# Patient Record
Sex: MALE | Race: WHITE | Employment: FULL TIME | URBAN - METROPOLITAN AREA
[De-identification: names, ages, dates, MRNs, and addresses within clinical notes are randomized per-mention and may not be internally consistent; named-entity substitution may affect disease eponyms.]

---

## 2019-02-14 ENCOUNTER — APPOINTMENT (OUTPATIENT)
Dept: CT IMAGING | Age: 54
End: 2019-02-14
Payer: COMMERCIAL

## 2019-02-14 ENCOUNTER — HOSPITAL ENCOUNTER (EMERGENCY)
Age: 54
Discharge: LEFT BEFORE DISCHARGE | End: 2019-02-15
Attending: EMERGENCY MEDICINE
Payer: COMMERCIAL

## 2019-02-14 ENCOUNTER — APPOINTMENT (OUTPATIENT)
Dept: GENERAL RADIOLOGY | Age: 54
End: 2019-02-14
Payer: COMMERCIAL

## 2019-02-14 DIAGNOSIS — R91.8 PULMONARY NODULES: Primary | ICD-10-CM

## 2019-02-14 DIAGNOSIS — R07.9 CHEST PAIN, UNSPECIFIED TYPE: ICD-10-CM

## 2019-02-14 LAB
ALBUMIN SERPL-MCNC: 3.6 G/DL (ref 3.5–5.2)
ALP BLD-CCNC: 89 U/L (ref 40–129)
ALT SERPL-CCNC: 38 U/L (ref 0–40)
ANION GAP SERPL CALCULATED.3IONS-SCNC: 6 MMOL/L (ref 7–16)
AST SERPL-CCNC: 43 U/L (ref 0–39)
BILIRUB SERPL-MCNC: 0.5 MG/DL (ref 0–1.2)
BUN BLDV-MCNC: 14 MG/DL (ref 6–20)
CALCIUM SERPL-MCNC: 8.6 MG/DL (ref 8.6–10.2)
CHLORIDE BLD-SCNC: 106 MMOL/L (ref 98–107)
CO2: 24 MMOL/L (ref 22–29)
CREAT SERPL-MCNC: 0.9 MG/DL (ref 0.7–1.2)
GFR AFRICAN AMERICAN: >60
GFR NON-AFRICAN AMERICAN: >60 ML/MIN/1.73
GLUCOSE BLD-MCNC: 98 MG/DL (ref 74–99)
HCT VFR BLD CALC: 42.3 % (ref 37–54)
HEMOGLOBIN: 13.6 G/DL (ref 12.5–16.5)
INR BLD: 1.2
MCH RBC QN AUTO: 28.2 PG (ref 26–35)
MCHC RBC AUTO-ENTMCNC: 32.2 % (ref 32–34.5)
MCV RBC AUTO: 87.6 FL (ref 80–99.9)
PDW BLD-RTO: 13.7 FL (ref 11.5–15)
PLATELET # BLD: 344 E9/L (ref 130–450)
PMV BLD AUTO: 10.2 FL (ref 7–12)
POTASSIUM SERPL-SCNC: 4.7 MMOL/L (ref 3.5–5)
PROTHROMBIN TIME: 13.8 SEC (ref 9.3–12.4)
RBC # BLD: 4.83 E12/L (ref 3.8–5.8)
SODIUM BLD-SCNC: 136 MMOL/L (ref 132–146)
TOTAL PROTEIN: 6.2 G/DL (ref 6.4–8.3)
TROPONIN: <0.01 NG/ML (ref 0–0.03)
WBC # BLD: 7.7 E9/L (ref 4.5–11.5)

## 2019-02-14 PROCEDURE — 6360000002 HC RX W HCPCS: Performed by: EMERGENCY MEDICINE

## 2019-02-14 PROCEDURE — 84484 ASSAY OF TROPONIN QUANT: CPT

## 2019-02-14 PROCEDURE — 71045 X-RAY EXAM CHEST 1 VIEW: CPT

## 2019-02-14 PROCEDURE — 93005 ELECTROCARDIOGRAM TRACING: CPT | Performed by: EMERGENCY MEDICINE

## 2019-02-14 PROCEDURE — 85027 COMPLETE CBC AUTOMATED: CPT

## 2019-02-14 PROCEDURE — 85610 PROTHROMBIN TIME: CPT

## 2019-02-14 PROCEDURE — 71275 CT ANGIOGRAPHY CHEST: CPT

## 2019-02-14 PROCEDURE — 96374 THER/PROPH/DIAG INJ IV PUSH: CPT

## 2019-02-14 PROCEDURE — 99285 EMERGENCY DEPT VISIT HI MDM: CPT

## 2019-02-14 PROCEDURE — 80053 COMPREHEN METABOLIC PANEL: CPT

## 2019-02-14 PROCEDURE — 6360000004 HC RX CONTRAST MEDICATION: Performed by: RADIOLOGY

## 2019-02-14 RX ORDER — KETOROLAC TROMETHAMINE 30 MG/ML
30 INJECTION, SOLUTION INTRAMUSCULAR; INTRAVENOUS ONCE
Status: COMPLETED | OUTPATIENT
Start: 2019-02-14 | End: 2019-02-14

## 2019-02-14 RX ADMIN — IOPAMIDOL 70 ML: 755 INJECTION, SOLUTION INTRAVENOUS at 23:00

## 2019-02-14 RX ADMIN — KETOROLAC TROMETHAMINE 30 MG: 30 INJECTION, SOLUTION INTRAMUSCULAR at 22:09

## 2019-02-14 ASSESSMENT — PAIN SCALES - GENERAL: PAINLEVEL_OUTOF10: 8

## 2019-02-15 VITALS
SYSTOLIC BLOOD PRESSURE: 158 MMHG | OXYGEN SATURATION: 96 % | RESPIRATION RATE: 17 BRPM | WEIGHT: 205 LBS | BODY MASS INDEX: 25.49 KG/M2 | DIASTOLIC BLOOD PRESSURE: 105 MMHG | HEART RATE: 90 BPM | HEIGHT: 75 IN | TEMPERATURE: 98.1 F

## 2019-02-15 LAB
EKG ATRIAL RATE: 100 BPM
EKG P AXIS: 81 DEGREES
EKG P-R INTERVAL: 144 MS
EKG Q-T INTERVAL: 380 MS
EKG QRS DURATION: 90 MS
EKG QTC CALCULATION (BAZETT): 490 MS
EKG R AXIS: 84 DEGREES
EKG T AXIS: 69 DEGREES
EKG VENTRICULAR RATE: 100 BPM
TROPONIN: <0.01 NG/ML (ref 0–0.03)

## 2019-02-15 PROCEDURE — 6360000002 HC RX W HCPCS: Performed by: EMERGENCY MEDICINE

## 2019-02-15 PROCEDURE — 36415 COLL VENOUS BLD VENIPUNCTURE: CPT

## 2019-02-15 PROCEDURE — 96372 THER/PROPH/DIAG INJ SC/IM: CPT

## 2019-02-15 PROCEDURE — 84484 ASSAY OF TROPONIN QUANT: CPT

## 2019-02-15 RX ORDER — ORPHENADRINE CITRATE 30 MG/ML
60 INJECTION INTRAMUSCULAR; INTRAVENOUS ONCE
Status: COMPLETED | OUTPATIENT
Start: 2019-02-15 | End: 2019-02-15

## 2019-02-15 RX ADMIN — ORPHENADRINE CITRATE 60 MG: 30 INJECTION INTRAMUSCULAR; INTRAVENOUS at 00:29

## 2019-02-15 ASSESSMENT — PAIN SCALES - GENERAL: PAINLEVEL_OUTOF10: 8

## 2019-02-25 ENCOUNTER — HOSPITAL ENCOUNTER (INPATIENT)
Age: 54
LOS: 2 days | Discharge: HOME | DRG: 291 | End: 2019-02-27
Attending: EMERGENCY MEDICINE | Admitting: INTERNAL MEDICINE
Payer: COMMERCIAL

## 2019-02-25 ENCOUNTER — APPOINTMENT (OUTPATIENT)
Dept: GENERAL RADIOLOGY | Age: 54
DRG: 291 | End: 2019-02-25
Payer: COMMERCIAL

## 2019-02-25 DIAGNOSIS — I50.9 CONGESTIVE HEART FAILURE OF UNKNOWN ETIOLOGY (HCC): Primary | ICD-10-CM

## 2019-02-25 DIAGNOSIS — J44.9 CHRONIC OBSTRUCTIVE PULMONARY DISEASE, UNSPECIFIED COPD TYPE (HCC): ICD-10-CM

## 2019-02-25 PROBLEM — R94.31 ABNORMAL QT INTERVAL PRESENT ON ELECTROCARDIOGRAM: Status: ACTIVE | Noted: 2019-02-25

## 2019-02-25 PROBLEM — R00.0 SINUS TACHYCARDIA BY ELECTROCARDIOGRAM: Status: ACTIVE | Noted: 2019-02-25

## 2019-02-25 LAB
ALBUMIN SERPL-MCNC: 3.9 G/DL (ref 3.5–5.2)
ALP BLD-CCNC: 99 U/L (ref 40–129)
ALT SERPL-CCNC: 22 U/L (ref 0–40)
ANION GAP SERPL CALCULATED.3IONS-SCNC: 8 MMOL/L (ref 7–16)
AST SERPL-CCNC: 18 U/L (ref 0–39)
BASOPHILS ABSOLUTE: 0.05 E9/L (ref 0–0.2)
BASOPHILS RELATIVE PERCENT: 0.7 % (ref 0–2)
BILIRUB SERPL-MCNC: 0.7 MG/DL (ref 0–1.2)
BUN BLDV-MCNC: 17 MG/DL (ref 6–20)
CALCIUM SERPL-MCNC: 9.1 MG/DL (ref 8.6–10.2)
CHLORIDE BLD-SCNC: 102 MMOL/L (ref 98–107)
CO2: 30 MMOL/L (ref 22–29)
CREAT SERPL-MCNC: 1.1 MG/DL (ref 0.7–1.2)
D DIMER: 311 NG/ML DDU
EOSINOPHILS ABSOLUTE: 0.13 E9/L (ref 0.05–0.5)
EOSINOPHILS RELATIVE PERCENT: 1.9 % (ref 0–6)
GFR AFRICAN AMERICAN: >60
GFR NON-AFRICAN AMERICAN: >60 ML/MIN/1.73
GLUCOSE BLD-MCNC: 97 MG/DL (ref 74–99)
HCT VFR BLD CALC: 44 % (ref 37–54)
HEMOGLOBIN: 13.9 G/DL (ref 12.5–16.5)
IMMATURE GRANULOCYTES #: 0.01 E9/L
IMMATURE GRANULOCYTES %: 0.1 % (ref 0–5)
LACTIC ACID: 1.1 MMOL/L (ref 0.5–2.2)
LYMPHOCYTES ABSOLUTE: 1.64 E9/L (ref 1.5–4)
LYMPHOCYTES RELATIVE PERCENT: 23.7 % (ref 20–42)
MCH RBC QN AUTO: 28.1 PG (ref 26–35)
MCHC RBC AUTO-ENTMCNC: 31.6 % (ref 32–34.5)
MCV RBC AUTO: 89.1 FL (ref 80–99.9)
MONOCYTES ABSOLUTE: 0.43 E9/L (ref 0.1–0.95)
MONOCYTES RELATIVE PERCENT: 6.2 % (ref 2–12)
NEUTROPHILS ABSOLUTE: 4.65 E9/L (ref 1.8–7.3)
NEUTROPHILS RELATIVE PERCENT: 67.4 % (ref 43–80)
PDW BLD-RTO: 14.6 FL (ref 11.5–15)
PLATELET # BLD: 238 E9/L (ref 130–450)
PMV BLD AUTO: 10.4 FL (ref 7–12)
POTASSIUM SERPL-SCNC: 4 MMOL/L (ref 3.5–5)
PRO-BNP: 6104 PG/ML (ref 0–125)
RBC # BLD: 4.94 E12/L (ref 3.8–5.8)
SODIUM BLD-SCNC: 140 MMOL/L (ref 132–146)
TOTAL PROTEIN: 6.4 G/DL (ref 6.4–8.3)
TROPONIN: <0.01 NG/ML (ref 0–0.03)
TROPONIN: <0.01 NG/ML (ref 0–0.03)
WBC # BLD: 6.9 E9/L (ref 4.5–11.5)

## 2019-02-25 PROCEDURE — 6360000002 HC RX W HCPCS: Performed by: CLINICAL NURSE SPECIALIST

## 2019-02-25 PROCEDURE — 85378 FIBRIN DEGRADE SEMIQUANT: CPT

## 2019-02-25 PROCEDURE — 83880 ASSAY OF NATRIURETIC PEPTIDE: CPT

## 2019-02-25 PROCEDURE — 36415 COLL VENOUS BLD VENIPUNCTURE: CPT

## 2019-02-25 PROCEDURE — 2580000003 HC RX 258: Performed by: CLINICAL NURSE SPECIALIST

## 2019-02-25 PROCEDURE — 80053 COMPREHEN METABOLIC PANEL: CPT

## 2019-02-25 PROCEDURE — 71046 X-RAY EXAM CHEST 2 VIEWS: CPT

## 2019-02-25 PROCEDURE — 2140000000 HC CCU INTERMEDIATE R&B

## 2019-02-25 PROCEDURE — 85025 COMPLETE CBC W/AUTO DIFF WBC: CPT

## 2019-02-25 PROCEDURE — 94664 DEMO&/EVAL PT USE INHALER: CPT

## 2019-02-25 PROCEDURE — 93005 ELECTROCARDIOGRAM TRACING: CPT | Performed by: NURSE PRACTITIONER

## 2019-02-25 PROCEDURE — 6370000000 HC RX 637 (ALT 250 FOR IP): Performed by: EMERGENCY MEDICINE

## 2019-02-25 PROCEDURE — 83605 ASSAY OF LACTIC ACID: CPT

## 2019-02-25 PROCEDURE — 99285 EMERGENCY DEPT VISIT HI MDM: CPT

## 2019-02-25 PROCEDURE — 6360000002 HC RX W HCPCS: Performed by: EMERGENCY MEDICINE

## 2019-02-25 PROCEDURE — 84484 ASSAY OF TROPONIN QUANT: CPT

## 2019-02-25 PROCEDURE — 6360000002 HC RX W HCPCS: Performed by: INTERNAL MEDICINE

## 2019-02-25 RX ORDER — METHYLPREDNISOLONE SODIUM SUCCINATE 125 MG/2ML
80 INJECTION, POWDER, LYOPHILIZED, FOR SOLUTION INTRAMUSCULAR; INTRAVENOUS ONCE
Status: COMPLETED | OUTPATIENT
Start: 2019-02-25 | End: 2019-02-25

## 2019-02-25 RX ORDER — ONDANSETRON 2 MG/ML
4 INJECTION INTRAMUSCULAR; INTRAVENOUS EVERY 6 HOURS PRN
Status: DISCONTINUED | OUTPATIENT
Start: 2019-02-25 | End: 2019-02-27 | Stop reason: HOSPADM

## 2019-02-25 RX ORDER — LEVALBUTEROL INHALATION SOLUTION 0.63 MG/3ML
0.63 SOLUTION RESPIRATORY (INHALATION)
Status: DISCONTINUED | OUTPATIENT
Start: 2019-02-26 | End: 2019-02-27 | Stop reason: HOSPADM

## 2019-02-25 RX ORDER — ASPIRIN 325 MG
325 TABLET ORAL ONCE
Status: COMPLETED | OUTPATIENT
Start: 2019-02-25 | End: 2019-02-25

## 2019-02-25 RX ORDER — SODIUM CHLORIDE 0.9 % (FLUSH) 0.9 %
10 SYRINGE (ML) INJECTION EVERY 12 HOURS SCHEDULED
Status: DISCONTINUED | OUTPATIENT
Start: 2019-02-25 | End: 2019-02-27 | Stop reason: HOSPADM

## 2019-02-25 RX ORDER — FUROSEMIDE 10 MG/ML
40 INJECTION INTRAMUSCULAR; INTRAVENOUS EVERY 8 HOURS
Status: DISCONTINUED | OUTPATIENT
Start: 2019-02-25 | End: 2019-02-26

## 2019-02-25 RX ORDER — IPRATROPIUM BROMIDE AND ALBUTEROL SULFATE 2.5; .5 MG/3ML; MG/3ML
1 SOLUTION RESPIRATORY (INHALATION) ONCE
Status: COMPLETED | OUTPATIENT
Start: 2019-02-25 | End: 2019-02-25

## 2019-02-25 RX ORDER — NITROGLYCERIN 0.4 MG/1
0.4 TABLET SUBLINGUAL EVERY 5 MIN PRN
Status: DISCONTINUED | OUTPATIENT
Start: 2019-02-25 | End: 2019-02-25

## 2019-02-25 RX ORDER — NITROGLYCERIN 0.4 MG/1
0.4 TABLET SUBLINGUAL EVERY 5 MIN PRN
Status: DISCONTINUED | OUTPATIENT
Start: 2019-02-25 | End: 2019-02-27 | Stop reason: HOSPADM

## 2019-02-25 RX ORDER — HYDRALAZINE HYDROCHLORIDE 20 MG/ML
10 INJECTION INTRAMUSCULAR; INTRAVENOUS EVERY 6 HOURS PRN
Status: DISCONTINUED | OUTPATIENT
Start: 2019-02-25 | End: 2019-02-27 | Stop reason: HOSPADM

## 2019-02-25 RX ORDER — METHYLPREDNISOLONE SODIUM SUCCINATE 40 MG/ML
40 INJECTION, POWDER, LYOPHILIZED, FOR SOLUTION INTRAMUSCULAR; INTRAVENOUS EVERY 8 HOURS
Status: DISCONTINUED | OUTPATIENT
Start: 2019-02-25 | End: 2019-02-26 | Stop reason: SDUPTHER

## 2019-02-25 RX ORDER — SODIUM CHLORIDE 0.9 % (FLUSH) 0.9 %
10 SYRINGE (ML) INJECTION PRN
Status: DISCONTINUED | OUTPATIENT
Start: 2019-02-25 | End: 2019-02-27 | Stop reason: HOSPADM

## 2019-02-25 RX ORDER — LISINOPRIL 10 MG/1
10 TABLET ORAL DAILY
Status: DISCONTINUED | OUTPATIENT
Start: 2019-02-26 | End: 2019-02-27 | Stop reason: HOSPADM

## 2019-02-25 RX ORDER — ASPIRIN 81 MG/1
81 TABLET, CHEWABLE ORAL DAILY
Status: DISCONTINUED | OUTPATIENT
Start: 2019-02-26 | End: 2019-02-27 | Stop reason: HOSPADM

## 2019-02-25 RX ORDER — FUROSEMIDE 10 MG/ML
40 INJECTION INTRAMUSCULAR; INTRAVENOUS ONCE
Status: COMPLETED | OUTPATIENT
Start: 2019-02-25 | End: 2019-02-25

## 2019-02-25 RX ADMIN — METHYLPREDNISOLONE SODIUM SUCCINATE 40 MG: 40 INJECTION, POWDER, LYOPHILIZED, FOR SOLUTION INTRAMUSCULAR; INTRAVENOUS at 23:59

## 2019-02-25 RX ADMIN — ASPIRIN 325 MG ORAL TABLET 325 MG: 325 PILL ORAL at 18:15

## 2019-02-25 RX ADMIN — FUROSEMIDE 40 MG: 10 INJECTION, SOLUTION INTRAMUSCULAR; INTRAVENOUS at 23:59

## 2019-02-25 RX ADMIN — METHYLPREDNISOLONE SODIUM SUCCINATE 80 MG: 125 INJECTION, POWDER, FOR SOLUTION INTRAMUSCULAR; INTRAVENOUS at 16:05

## 2019-02-25 RX ADMIN — FUROSEMIDE 40 MG: 10 INJECTION, SOLUTION INTRAMUSCULAR; INTRAVENOUS at 16:03

## 2019-02-25 RX ADMIN — Medication 10 ML: at 23:59

## 2019-02-25 RX ADMIN — HYDRALAZINE HYDROCHLORIDE 10 MG: 20 INJECTION INTRAMUSCULAR; INTRAVENOUS at 18:15

## 2019-02-25 RX ADMIN — IPRATROPIUM BROMIDE AND ALBUTEROL SULFATE 1 AMPULE: .5; 3 SOLUTION RESPIRATORY (INHALATION) at 16:26

## 2019-02-25 ASSESSMENT — ENCOUNTER SYMPTOMS
SHORTNESS OF BREATH: 1
ABDOMINAL PAIN: 0
COUGH: 0
WHEEZING: 1

## 2019-02-25 ASSESSMENT — PAIN SCALES - GENERAL
PAINLEVEL_OUTOF10: 6
PAINLEVEL_OUTOF10: 0

## 2019-02-25 ASSESSMENT — PAIN DESCRIPTION - PAIN TYPE: TYPE: ACUTE PAIN

## 2019-02-25 ASSESSMENT — PAIN DESCRIPTION - LOCATION: LOCATION: CHEST

## 2019-02-25 ASSESSMENT — PAIN DESCRIPTION - DESCRIPTORS: DESCRIPTORS: CONSTANT

## 2019-02-26 ENCOUNTER — APPOINTMENT (OUTPATIENT)
Dept: ULTRASOUND IMAGING | Age: 54
DRG: 291 | End: 2019-02-26
Payer: COMMERCIAL

## 2019-02-26 LAB
AMPHETAMINE SCREEN, URINE: NOT DETECTED
BARBITURATE SCREEN URINE: NOT DETECTED
BENZODIAZEPINE SCREEN, URINE: NOT DETECTED
CANNABINOID SCREEN URINE: POSITIVE
CHOLESTEROL, TOTAL: 142 MG/DL (ref 0–199)
CK MB: 3.8 NG/ML (ref 0–7.7)
COCAINE METABOLITE SCREEN URINE: NOT DETECTED
HDLC SERPL-MCNC: 54 MG/DL
LDL CHOLESTEROL CALCULATED: 80 MG/DL (ref 0–99)
LV EF: 53 %
LVEF MODALITY: NORMAL
MAGNESIUM: 1.7 MG/DL (ref 1.6–2.6)
METHADONE SCREEN, URINE: NOT DETECTED
OPIATE SCREEN URINE: NOT DETECTED
PHENCYCLIDINE SCREEN URINE: NOT DETECTED
PROPOXYPHENE SCREEN: NOT DETECTED
T4 FREE: 1.08 NG/DL (ref 0.93–1.7)
TOTAL CK: 71 U/L (ref 20–200)
TRIGL SERPL-MCNC: 39 MG/DL (ref 0–149)
TROPONIN: <0.01 NG/ML (ref 0–0.03)
TSH SERPL DL<=0.05 MIU/L-ACNC: 1.43 UIU/ML (ref 0.27–4.2)
VLDLC SERPL CALC-MCNC: 8 MG/DL

## 2019-02-26 PROCEDURE — G0480 DRUG TEST DEF 1-7 CLASSES: HCPCS

## 2019-02-26 PROCEDURE — 36415 COLL VENOUS BLD VENIPUNCTURE: CPT

## 2019-02-26 PROCEDURE — 80307 DRUG TEST PRSMV CHEM ANLYZR: CPT

## 2019-02-26 PROCEDURE — 6360000002 HC RX W HCPCS: Performed by: INTERNAL MEDICINE

## 2019-02-26 PROCEDURE — 99255 IP/OBS CONSLTJ NEW/EST HI 80: CPT | Performed by: INTERNAL MEDICINE

## 2019-02-26 PROCEDURE — 94640 AIRWAY INHALATION TREATMENT: CPT

## 2019-02-26 PROCEDURE — APPSS60 APP SPLIT SHARED TIME 46-60 MINUTES: Performed by: NURSE PRACTITIONER

## 2019-02-26 PROCEDURE — 82550 ASSAY OF CK (CPK): CPT

## 2019-02-26 PROCEDURE — 83735 ASSAY OF MAGNESIUM: CPT

## 2019-02-26 PROCEDURE — 2580000003 HC RX 258: Performed by: CLINICAL NURSE SPECIALIST

## 2019-02-26 PROCEDURE — 87798 DETECT AGENT NOS DNA AMP: CPT

## 2019-02-26 PROCEDURE — 87581 M.PNEUMON DNA AMP PROBE: CPT

## 2019-02-26 PROCEDURE — 84484 ASSAY OF TROPONIN QUANT: CPT

## 2019-02-26 PROCEDURE — 80061 LIPID PANEL: CPT

## 2019-02-26 PROCEDURE — 84443 ASSAY THYROID STIM HORMONE: CPT

## 2019-02-26 PROCEDURE — 2140000000 HC CCU INTERMEDIATE R&B

## 2019-02-26 PROCEDURE — 93306 TTE W/DOPPLER COMPLETE: CPT

## 2019-02-26 PROCEDURE — 87633 RESP VIRUS 12-25 TARGETS: CPT

## 2019-02-26 PROCEDURE — 82553 CREATINE MB FRACTION: CPT

## 2019-02-26 PROCEDURE — 6370000000 HC RX 637 (ALT 250 FOR IP): Performed by: CLINICAL NURSE SPECIALIST

## 2019-02-26 PROCEDURE — 93970 EXTREMITY STUDY: CPT

## 2019-02-26 PROCEDURE — 87486 CHLMYD PNEUM DNA AMP PROBE: CPT

## 2019-02-26 PROCEDURE — 84439 ASSAY OF FREE THYROXINE: CPT

## 2019-02-26 PROCEDURE — 94664 DEMO&/EVAL PT USE INHALER: CPT

## 2019-02-26 RX ORDER — FUROSEMIDE 10 MG/ML
40 INJECTION INTRAMUSCULAR; INTRAVENOUS 2 TIMES DAILY
Status: DISCONTINUED | OUTPATIENT
Start: 2019-02-26 | End: 2019-02-27 | Stop reason: HOSPADM

## 2019-02-26 RX ORDER — METHYLPREDNISOLONE SODIUM SUCCINATE 125 MG/2ML
40 INJECTION, POWDER, LYOPHILIZED, FOR SOLUTION INTRAMUSCULAR; INTRAVENOUS EVERY 8 HOURS
Status: DISCONTINUED | OUTPATIENT
Start: 2019-02-26 | End: 2019-02-27 | Stop reason: HOSPADM

## 2019-02-26 RX ADMIN — METHYLPREDNISOLONE SODIUM SUCCINATE 40 MG: 125 INJECTION, POWDER, FOR SOLUTION INTRAMUSCULAR; INTRAVENOUS at 22:08

## 2019-02-26 RX ADMIN — METHYLPREDNISOLONE SODIUM SUCCINATE 40 MG: 125 INJECTION, POWDER, FOR SOLUTION INTRAMUSCULAR; INTRAVENOUS at 06:44

## 2019-02-26 RX ADMIN — IPRATROPIUM BROMIDE 0.5 MG: 0.5 SOLUTION RESPIRATORY (INHALATION) at 17:49

## 2019-02-26 RX ADMIN — ASPIRIN 81 MG 81 MG: 81 TABLET ORAL at 10:37

## 2019-02-26 RX ADMIN — LEVALBUTEROL 0.63 MG: 0.63 SOLUTION RESPIRATORY (INHALATION) at 22:54

## 2019-02-26 RX ADMIN — METHYLPREDNISOLONE SODIUM SUCCINATE 40 MG: 125 INJECTION, POWDER, FOR SOLUTION INTRAMUSCULAR; INTRAVENOUS at 14:41

## 2019-02-26 RX ADMIN — LEVALBUTEROL 0.63 MG: 0.63 SOLUTION RESPIRATORY (INHALATION) at 06:39

## 2019-02-26 RX ADMIN — LEVALBUTEROL 0.63 MG: 0.63 SOLUTION RESPIRATORY (INHALATION) at 17:49

## 2019-02-26 RX ADMIN — IPRATROPIUM BROMIDE 0.5 MG: 0.5 SOLUTION RESPIRATORY (INHALATION) at 06:30

## 2019-02-26 RX ADMIN — LISINOPRIL 10 MG: 10 TABLET ORAL at 10:37

## 2019-02-26 RX ADMIN — FUROSEMIDE 40 MG: 10 INJECTION, SOLUTION INTRAMUSCULAR; INTRAVENOUS at 18:03

## 2019-02-26 RX ADMIN — IPRATROPIUM BROMIDE 0.5 MG: 0.5 SOLUTION RESPIRATORY (INHALATION) at 22:54

## 2019-02-26 RX ADMIN — FUROSEMIDE 40 MG: 10 INJECTION, SOLUTION INTRAMUSCULAR; INTRAVENOUS at 06:45

## 2019-02-26 RX ADMIN — Medication 10 ML: at 10:37

## 2019-02-26 RX ADMIN — Medication 10 ML: at 22:08

## 2019-02-26 ASSESSMENT — PAIN SCALES - GENERAL
PAINLEVEL_OUTOF10: 0

## 2019-02-27 ENCOUNTER — APPOINTMENT (OUTPATIENT)
Dept: NUCLEAR MEDICINE | Age: 54
DRG: 291 | End: 2019-02-27
Payer: COMMERCIAL

## 2019-02-27 VITALS
RESPIRATION RATE: 16 BRPM | HEIGHT: 75 IN | OXYGEN SATURATION: 94 % | DIASTOLIC BLOOD PRESSURE: 88 MMHG | SYSTOLIC BLOOD PRESSURE: 157 MMHG | WEIGHT: 178.7 LBS | HEART RATE: 103 BPM | BODY MASS INDEX: 22.22 KG/M2 | TEMPERATURE: 98 F

## 2019-02-27 LAB
EKG ATRIAL RATE: 103 BPM
EKG P AXIS: 59 DEGREES
EKG P-R INTERVAL: 158 MS
EKG Q-T INTERVAL: 380 MS
EKG QRS DURATION: 90 MS
EKG QTC CALCULATION (BAZETT): 497 MS
EKG R AXIS: -8 DEGREES
EKG T AXIS: 57 DEGREES
EKG VENTRICULAR RATE: 103 BPM
FILM ARRAY ADENOVIRUS: NORMAL
FILM ARRAY BORDETELLA PERTUSSIS: NORMAL
FILM ARRAY CHLAMYDOPHILIA PNEUMONIAE: NORMAL
FILM ARRAY CORONAVIRUS 229E: NORMAL
FILM ARRAY CORONAVIRUS HKU1: NORMAL
FILM ARRAY CORONAVIRUS NL63: NORMAL
FILM ARRAY CORONAVIRUS OC43: NORMAL
FILM ARRAY INFLUENZA A VIRUS 09H1: NORMAL
FILM ARRAY INFLUENZA A VIRUS H1: NORMAL
FILM ARRAY INFLUENZA A VIRUS H3: NORMAL
FILM ARRAY INFLUENZA A VIRUS: NORMAL
FILM ARRAY INFLUENZA B: NORMAL
FILM ARRAY METAPNEUMOVIRUS: NORMAL
FILM ARRAY MYCOPLASMA PNEUMONIAE: NORMAL
FILM ARRAY PARAINFLUENZA VIRUS 1: NORMAL
FILM ARRAY PARAINFLUENZA VIRUS 2: NORMAL
FILM ARRAY PARAINFLUENZA VIRUS 3: NORMAL
FILM ARRAY PARAINFLUENZA VIRUS 4: NORMAL
FILM ARRAY RESPIRATORY SYNCITIAL VIRUS: NORMAL
FILM ARRAY RHINOVIRUS/ENTEROVIRUS: NORMAL
LV EF: 37 %
LVEF MODALITY: NORMAL

## 2019-02-27 PROCEDURE — 78452 HT MUSCLE IMAGE SPECT MULT: CPT

## 2019-02-27 PROCEDURE — 2580000003 HC RX 258: Performed by: CLINICAL NURSE SPECIALIST

## 2019-02-27 PROCEDURE — 6370000000 HC RX 637 (ALT 250 FOR IP): Performed by: INTERNAL MEDICINE

## 2019-02-27 PROCEDURE — A9500 TC99M SESTAMIBI: HCPCS | Performed by: RADIOLOGY

## 2019-02-27 PROCEDURE — 3430000000 HC RX DIAGNOSTIC RADIOPHARMACEUTICAL: Performed by: RADIOLOGY

## 2019-02-27 PROCEDURE — 93017 CV STRESS TEST TRACING ONLY: CPT

## 2019-02-27 PROCEDURE — 93018 CV STRESS TEST I&R ONLY: CPT | Performed by: INTERNAL MEDICINE

## 2019-02-27 PROCEDURE — 6360000002 HC RX W HCPCS: Performed by: INTERNAL MEDICINE

## 2019-02-27 PROCEDURE — 99233 SBSQ HOSP IP/OBS HIGH 50: CPT | Performed by: INTERNAL MEDICINE

## 2019-02-27 PROCEDURE — 93016 CV STRESS TEST SUPVJ ONLY: CPT | Performed by: INTERNAL MEDICINE

## 2019-02-27 PROCEDURE — 6370000000 HC RX 637 (ALT 250 FOR IP): Performed by: CLINICAL NURSE SPECIALIST

## 2019-02-27 RX ORDER — FUROSEMIDE 20 MG/1
20 TABLET ORAL DAILY
Qty: 30 TABLET | Refills: 0 | Status: SHIPPED | OUTPATIENT
Start: 2019-02-27 | End: 2019-04-08 | Stop reason: SDUPTHER

## 2019-02-27 RX ORDER — ALBUTEROL SULFATE 90 UG/1
2 AEROSOL, METERED RESPIRATORY (INHALATION) 4 TIMES DAILY PRN
Qty: 1 INHALER | Refills: 0 | Status: SHIPPED | OUTPATIENT
Start: 2019-02-27

## 2019-02-27 RX ORDER — METOPROLOL SUCCINATE 25 MG/1
25 TABLET, EXTENDED RELEASE ORAL DAILY
Status: DISCONTINUED | OUTPATIENT
Start: 2019-02-27 | End: 2019-02-27 | Stop reason: HOSPADM

## 2019-02-27 RX ORDER — NITROGLYCERIN 0.4 MG/1
TABLET SUBLINGUAL
Qty: 25 TABLET | Refills: 0 | Status: SHIPPED | OUTPATIENT
Start: 2019-02-27

## 2019-02-27 RX ORDER — METOPROLOL SUCCINATE 25 MG/1
25 TABLET, EXTENDED RELEASE ORAL DAILY
Qty: 30 TABLET | Refills: 0 | Status: SHIPPED | OUTPATIENT
Start: 2019-02-27 | End: 2019-04-08 | Stop reason: SDUPTHER

## 2019-02-27 RX ORDER — METHYLPREDNISOLONE 4 MG/1
TABLET ORAL
Qty: 1 KIT | Refills: 0 | Status: SHIPPED | OUTPATIENT
Start: 2019-02-27 | End: 2019-03-05

## 2019-02-27 RX ORDER — ASPIRIN 81 MG/1
81 TABLET, CHEWABLE ORAL DAILY
Qty: 30 TABLET | Refills: 0 | Status: SHIPPED | OUTPATIENT
Start: 2019-02-28

## 2019-02-27 RX ORDER — LISINOPRIL 10 MG/1
10 TABLET ORAL DAILY
Qty: 30 TABLET | Refills: 0 | Status: SHIPPED | OUTPATIENT
Start: 2019-02-28 | End: 2019-04-08 | Stop reason: SDUPTHER

## 2019-02-27 RX ADMIN — LISINOPRIL 10 MG: 10 TABLET ORAL at 12:27

## 2019-02-27 RX ADMIN — Medication 34 MILLICURIE: at 10:12

## 2019-02-27 RX ADMIN — METOPROLOL SUCCINATE 25 MG: 25 TABLET, EXTENDED RELEASE ORAL at 14:17

## 2019-02-27 RX ADMIN — METHYLPREDNISOLONE SODIUM SUCCINATE 40 MG: 125 INJECTION, POWDER, FOR SOLUTION INTRAMUSCULAR; INTRAVENOUS at 14:17

## 2019-02-27 RX ADMIN — ASPIRIN 81 MG 81 MG: 81 TABLET ORAL at 12:27

## 2019-02-27 RX ADMIN — Medication 11 MILLICURIE: at 07:49

## 2019-02-27 RX ADMIN — METHYLPREDNISOLONE SODIUM SUCCINATE 40 MG: 125 INJECTION, POWDER, FOR SOLUTION INTRAMUSCULAR; INTRAVENOUS at 06:59

## 2019-02-27 RX ADMIN — Medication 10 ML: at 12:28

## 2019-02-27 RX ADMIN — FUROSEMIDE 40 MG: 10 INJECTION, SOLUTION INTRAMUSCULAR; INTRAVENOUS at 12:27

## 2019-02-27 ASSESSMENT — PAIN SCALES - GENERAL: PAINLEVEL_OUTOF10: 0

## 2019-03-01 ENCOUNTER — TELEPHONE (OUTPATIENT)
Dept: INTERNAL MEDICINE | Age: 54
End: 2019-03-01

## 2019-03-03 LAB — CANNABINOIDS CONF, URINE: 45 NG/ML

## 2019-03-04 ENCOUNTER — TELEPHONE (OUTPATIENT)
Dept: CARDIOLOGY CLINIC | Age: 54
End: 2019-03-04

## 2019-03-07 ENCOUNTER — TELEPHONE (OUTPATIENT)
Dept: INTERNAL MEDICINE | Age: 54
End: 2019-03-07

## 2019-04-08 ENCOUNTER — TELEPHONE (OUTPATIENT)
Dept: ADMINISTRATIVE | Age: 54
End: 2019-04-08

## 2019-04-08 RX ORDER — LISINOPRIL 10 MG/1
10 TABLET ORAL DAILY
Qty: 30 TABLET | Refills: 11 | Status: SHIPPED | OUTPATIENT
Start: 2019-04-08

## 2019-04-08 RX ORDER — FUROSEMIDE 20 MG/1
20 TABLET ORAL DAILY
Qty: 30 TABLET | Refills: 11 | Status: SHIPPED | OUTPATIENT
Start: 2019-04-08 | End: 2019-05-08

## 2019-04-08 RX ORDER — METOPROLOL SUCCINATE 25 MG/1
25 TABLET, EXTENDED RELEASE ORAL DAILY
Qty: 30 TABLET | Refills: 11 | Status: SHIPPED | OUTPATIENT
Start: 2019-04-08

## 2019-04-08 NOTE — TELEPHONE ENCOUNTER
Pt calling he needs a refill on his metoprolol succinate 25 mg daily, Lasix 20 mg daily, and lisinopril 10 mg daily; to [facility] in [facility].

## 2023-01-25 ENCOUNTER — APPOINTMENT (OUTPATIENT)
Dept: GENERAL RADIOLOGY | Age: 58
DRG: 064 | End: 2023-01-25
Payer: COMMERCIAL

## 2023-01-25 ENCOUNTER — APPOINTMENT (OUTPATIENT)
Dept: CT IMAGING | Age: 58
DRG: 064 | End: 2023-01-25
Payer: COMMERCIAL

## 2023-01-25 ENCOUNTER — HOSPITAL ENCOUNTER (INPATIENT)
Age: 58
LOS: 5 days | Discharge: HOME HEALTH | DRG: 064 | End: 2023-01-30
Attending: EMERGENCY MEDICINE | Admitting: INTERNAL MEDICINE
Payer: COMMERCIAL

## 2023-01-25 DIAGNOSIS — I62.9 INTRACRANIAL HEMORRHAGE (HCC): Primary | ICD-10-CM

## 2023-01-25 DIAGNOSIS — R41.0 DISORIENTATION: ICD-10-CM

## 2023-01-25 LAB
ALBUMIN SERPL-MCNC: 4.7 G/DL (ref 3.5–5.2)
ALP BLD-CCNC: 70 U/L (ref 40–129)
ALT SERPL-CCNC: 21 U/L (ref 0–40)
ANION GAP SERPL CALCULATED.3IONS-SCNC: 25 MMOL/L (ref 7–16)
APTT: 32.6 SEC (ref 24.5–35.1)
AST SERPL-CCNC: 27 U/L (ref 0–39)
BASOPHILS ABSOLUTE: 0.08 E9/L (ref 0–0.2)
BASOPHILS RELATIVE PERCENT: 1.5 % (ref 0–2)
BILIRUB SERPL-MCNC: 0.8 MG/DL (ref 0–1.2)
BUN BLDV-MCNC: 16 MG/DL (ref 6–23)
CALCIUM SERPL-MCNC: 10.3 MG/DL (ref 8.6–10.2)
CHLORIDE BLD-SCNC: 94 MMOL/L (ref 98–107)
CO2: 18 MMOL/L (ref 22–29)
CREAT SERPL-MCNC: 1.2 MG/DL (ref 0.7–1.2)
EKG ATRIAL RATE: 63 BPM
EKG P AXIS: 72 DEGREES
EKG P-R INTERVAL: 156 MS
EKG Q-T INTERVAL: 442 MS
EKG QRS DURATION: 98 MS
EKG QTC CALCULATION (BAZETT): 452 MS
EKG R AXIS: -36 DEGREES
EKG T AXIS: 96 DEGREES
EKG VENTRICULAR RATE: 63 BPM
EOSINOPHILS ABSOLUTE: 0.16 E9/L (ref 0.05–0.5)
EOSINOPHILS RELATIVE PERCENT: 3 % (ref 0–6)
GFR SERPL CREATININE-BSD FRML MDRD: >60 ML/MIN/1.73
GLUCOSE BLD-MCNC: 124 MG/DL (ref 74–99)
HCT VFR BLD CALC: 52.9 % (ref 37–54)
HEMOGLOBIN: 17.4 G/DL (ref 12.5–16.5)
IMMATURE GRANULOCYTES #: 0.02 E9/L
IMMATURE GRANULOCYTES %: 0.4 % (ref 0–5)
INR BLD: 1.3
LACTIC ACID: 3.4 MMOL/L (ref 0.5–2.2)
LYMPHOCYTES ABSOLUTE: 1.35 E9/L (ref 1.5–4)
LYMPHOCYTES RELATIVE PERCENT: 25.6 % (ref 20–42)
MCH RBC QN AUTO: 28.2 PG (ref 26–35)
MCHC RBC AUTO-ENTMCNC: 32.9 % (ref 32–34.5)
MCV RBC AUTO: 85.6 FL (ref 80–99.9)
MONOCYTES ABSOLUTE: 0.26 E9/L (ref 0.1–0.95)
MONOCYTES RELATIVE PERCENT: 4.9 % (ref 2–12)
NEUTROPHILS ABSOLUTE: 3.41 E9/L (ref 1.8–7.3)
NEUTROPHILS RELATIVE PERCENT: 64.6 % (ref 43–80)
PDW BLD-RTO: 13.2 FL (ref 11.5–15)
PLATELET # BLD: 217 E9/L (ref 130–450)
PMV BLD AUTO: 11 FL (ref 7–12)
POTASSIUM SERPL-SCNC: 4.1 MMOL/L (ref 3.5–5)
PROTHROMBIN TIME: 14.1 SEC (ref 9.3–12.4)
RBC # BLD: 6.18 E12/L (ref 3.8–5.8)
SODIUM BLD-SCNC: 137 MMOL/L (ref 132–146)
TOTAL PROTEIN: 7.5 G/DL (ref 6.4–8.3)
TROPONIN, HIGH SENSITIVITY: 26 NG/L (ref 0–11)
WBC # BLD: 5.3 E9/L (ref 4.5–11.5)

## 2023-01-25 PROCEDURE — 99291 CRITICAL CARE FIRST HOUR: CPT | Performed by: SURGERY

## 2023-01-25 PROCEDURE — 85610 PROTHROMBIN TIME: CPT

## 2023-01-25 PROCEDURE — 80179 DRUG ASSAY SALICYLATE: CPT

## 2023-01-25 PROCEDURE — 85730 THROMBOPLASTIN TIME PARTIAL: CPT

## 2023-01-25 PROCEDURE — 6360000002 HC RX W HCPCS: Performed by: PHYSICIAN ASSISTANT

## 2023-01-25 PROCEDURE — 84484 ASSAY OF TROPONIN QUANT: CPT

## 2023-01-25 PROCEDURE — 82077 ASSAY SPEC XCP UR&BREATH IA: CPT

## 2023-01-25 PROCEDURE — 2500000003 HC RX 250 WO HCPCS: Performed by: EMERGENCY MEDICINE

## 2023-01-25 PROCEDURE — 80307 DRUG TEST PRSMV CHEM ANLYZR: CPT

## 2023-01-25 PROCEDURE — 2580000003 HC RX 258: Performed by: STUDENT IN AN ORGANIZED HEALTH CARE EDUCATION/TRAINING PROGRAM

## 2023-01-25 PROCEDURE — 93010 ELECTROCARDIOGRAM REPORT: CPT | Performed by: INTERNAL MEDICINE

## 2023-01-25 PROCEDURE — 87040 BLOOD CULTURE FOR BACTERIA: CPT

## 2023-01-25 PROCEDURE — 85025 COMPLETE CBC W/AUTO DIFF WBC: CPT

## 2023-01-25 PROCEDURE — 36415 COLL VENOUS BLD VENIPUNCTURE: CPT

## 2023-01-25 PROCEDURE — 83605 ASSAY OF LACTIC ACID: CPT

## 2023-01-25 PROCEDURE — 6370000000 HC RX 637 (ALT 250 FOR IP): Performed by: STUDENT IN AN ORGANIZED HEALTH CARE EDUCATION/TRAINING PROGRAM

## 2023-01-25 PROCEDURE — 2580000003 HC RX 258: Performed by: EMERGENCY MEDICINE

## 2023-01-25 PROCEDURE — 80143 DRUG ASSAY ACETAMINOPHEN: CPT

## 2023-01-25 PROCEDURE — 99285 EMERGENCY DEPT VISIT HI MDM: CPT

## 2023-01-25 PROCEDURE — 93005 ELECTROCARDIOGRAM TRACING: CPT | Performed by: EMERGENCY MEDICINE

## 2023-01-25 PROCEDURE — 51798 US URINE CAPACITY MEASURE: CPT

## 2023-01-25 PROCEDURE — 70450 CT HEAD/BRAIN W/O DYE: CPT

## 2023-01-25 PROCEDURE — 71045 X-RAY EXAM CHEST 1 VIEW: CPT

## 2023-01-25 PROCEDURE — 96367 TX/PROPH/DG ADDL SEQ IV INF: CPT

## 2023-01-25 PROCEDURE — 2000000000 HC ICU R&B

## 2023-01-25 PROCEDURE — 96365 THER/PROPH/DIAG IV INF INIT: CPT

## 2023-01-25 PROCEDURE — 80053 COMPREHEN METABOLIC PANEL: CPT

## 2023-01-25 RX ORDER — LEVETIRACETAM 10 MG/ML
1000 INJECTION INTRAVASCULAR ONCE
Status: COMPLETED | OUTPATIENT
Start: 2023-01-25 | End: 2023-01-25

## 2023-01-25 RX ORDER — ONDANSETRON 2 MG/ML
4 INJECTION INTRAMUSCULAR; INTRAVENOUS EVERY 6 HOURS PRN
Status: DISCONTINUED | OUTPATIENT
Start: 2023-01-25 | End: 2023-01-30 | Stop reason: HOSPADM

## 2023-01-25 RX ORDER — LISINOPRIL 10 MG/1
10 TABLET ORAL DAILY
Status: DISCONTINUED | OUTPATIENT
Start: 2023-01-25 | End: 2023-01-30 | Stop reason: HOSPADM

## 2023-01-25 RX ORDER — LABETALOL HYDROCHLORIDE 5 MG/ML
10 INJECTION, SOLUTION INTRAVENOUS EVERY 30 MIN PRN
Status: DISCONTINUED | OUTPATIENT
Start: 2023-01-25 | End: 2023-01-29

## 2023-01-25 RX ORDER — ONDANSETRON 4 MG/1
4 TABLET, ORALLY DISINTEGRATING ORAL EVERY 8 HOURS PRN
Status: DISCONTINUED | OUTPATIENT
Start: 2023-01-25 | End: 2023-01-30 | Stop reason: HOSPADM

## 2023-01-25 RX ORDER — 0.9 % SODIUM CHLORIDE 0.9 %
500 INTRAVENOUS SOLUTION INTRAVENOUS ONCE
Status: COMPLETED | OUTPATIENT
Start: 2023-01-26 | End: 2023-01-26

## 2023-01-25 RX ORDER — SODIUM CHLORIDE 9 MG/ML
INJECTION, SOLUTION INTRAVENOUS PRN
Status: DISCONTINUED | OUTPATIENT
Start: 2023-01-25 | End: 2023-01-30 | Stop reason: HOSPADM

## 2023-01-25 RX ORDER — SODIUM CHLORIDE 0.9 % (FLUSH) 0.9 %
10 SYRINGE (ML) INJECTION EVERY 12 HOURS SCHEDULED
Status: DISCONTINUED | OUTPATIENT
Start: 2023-01-25 | End: 2023-01-30 | Stop reason: HOSPADM

## 2023-01-25 RX ORDER — SODIUM CHLORIDE 0.9 % (FLUSH) 0.9 %
10 SYRINGE (ML) INJECTION PRN
Status: DISCONTINUED | OUTPATIENT
Start: 2023-01-25 | End: 2023-01-30 | Stop reason: HOSPADM

## 2023-01-25 RX ORDER — ACETAMINOPHEN 325 MG/1
650 TABLET ORAL EVERY 6 HOURS
Status: DISCONTINUED | OUTPATIENT
Start: 2023-01-25 | End: 2023-01-30 | Stop reason: HOSPADM

## 2023-01-25 RX ORDER — NICARDIPINE HYDROCHLORIDE 2.5 MG/ML
INJECTION INTRAVENOUS
Status: DISCONTINUED
Start: 2023-01-25 | End: 2023-01-26

## 2023-01-25 RX ORDER — HYDRALAZINE HYDROCHLORIDE 20 MG/ML
10 INJECTION INTRAMUSCULAR; INTRAVENOUS EVERY 30 MIN PRN
Status: DISCONTINUED | OUTPATIENT
Start: 2023-01-25 | End: 2023-01-29

## 2023-01-25 RX ORDER — LEVETIRACETAM 5 MG/ML
500 INJECTION INTRAVASCULAR EVERY 12 HOURS
Status: DISCONTINUED | OUTPATIENT
Start: 2023-01-26 | End: 2023-01-26

## 2023-01-25 RX ADMIN — SODIUM CHLORIDE 10 MG/HR: 9 INJECTION, SOLUTION INTRAVENOUS at 18:17

## 2023-01-25 RX ADMIN — METOPROLOL TARTRATE 25 MG: 25 TABLET, FILM COATED ORAL at 21:28

## 2023-01-25 RX ADMIN — LISINOPRIL 10 MG: 10 TABLET ORAL at 20:02

## 2023-01-25 RX ADMIN — SODIUM CHLORIDE 5 MG/HR: 9 INJECTION, SOLUTION INTRAVENOUS at 21:50

## 2023-01-25 RX ADMIN — Medication 10 ML: at 21:31

## 2023-01-25 RX ADMIN — SODIUM CHLORIDE 5 MG/HR: 9 INJECTION, SOLUTION INTRAVENOUS at 14:38

## 2023-01-25 RX ADMIN — LEVETIRACETAM 1000 MG: 10 INJECTION INTRAVENOUS at 14:23

## 2023-01-25 RX ADMIN — ACETAMINOPHEN 650 MG: 325 TABLET ORAL at 20:03

## 2023-01-25 ASSESSMENT — PAIN DESCRIPTION - DESCRIPTORS: DESCRIPTORS: DISCOMFORT;DULL;ACHING

## 2023-01-25 ASSESSMENT — ENCOUNTER SYMPTOMS
ALLERGIC/IMMUNOLOGIC NEGATIVE: 1
RESPIRATORY NEGATIVE: 1
GASTROINTESTINAL NEGATIVE: 1
EYES NEGATIVE: 1

## 2023-01-25 ASSESSMENT — PAIN DESCRIPTION - FREQUENCY: FREQUENCY: CONTINUOUS

## 2023-01-25 ASSESSMENT — PAIN - FUNCTIONAL ASSESSMENT: PAIN_FUNCTIONAL_ASSESSMENT: ACTIVITIES ARE NOT PREVENTED

## 2023-01-25 ASSESSMENT — PAIN DESCRIPTION - LOCATION: LOCATION: GENERALIZED

## 2023-01-25 ASSESSMENT — PAIN DESCRIPTION - PAIN TYPE: TYPE: CHRONIC PAIN

## 2023-01-25 ASSESSMENT — LIFESTYLE VARIABLES: HOW OFTEN DO YOU HAVE A DRINK CONTAINING ALCOHOL: MONTHLY OR LESS

## 2023-01-25 ASSESSMENT — PAIN DESCRIPTION - ONSET: ONSET: ON-GOING

## 2023-01-25 ASSESSMENT — PAIN SCALES - GENERAL: PAINLEVEL_OUTOF10: 3

## 2023-01-25 NOTE — ED PROVIDER NOTES
Department of Emergency Medicine   ED  Provider Note  Admit Date/RoomTime: 1/25/2023 12:51 PM  ED Room: 23/23          History of Present Illness:  1/25/23, Time: 3:57 PM EST  Chief Complaint   Patient presents with    Other     PATIENT C/O NEAR SYNCOPE PER EMS. PATIENT A&O X 1 PERSON ONLY. Adela Diaz is a 59 y.o. male presenting to the ED for syncope. Patient was at a restaurant where he nearly passed out. He was confused after he passed out. Unclear what his actual baseline is, but he is ANO x1 per EMS report. He has no symptoms or complaints at this time. Was evaluated outside facility, sent in for evaluation. No fevers or chills. No neck pain or stiffness. No weakness or numbness in extremities. No other symptoms or complaints. Review of Systems:   Pertinent positives and negatives are stated within HPI, all other systems reviewed and are negative.        --------------------------------------------- PAST HISTORY ---------------------------------------------  Past Medical History:  has no past medical history on file. Past Surgical History:  has a past surgical history that includes Colonoscopy. Social History:  reports that he has been smoking. He has been smoking an average of 1 pack per day. He has never used smokeless tobacco. He reports current alcohol use. He reports current drug use. Drug: Marijuana Layne Cotton). Family History: family history is not on file. . Unless otherwise noted, family history is non contributory    The patients home medications have been reviewed. Allergies: Patient has no known allergies.         ---------------------------------------------------PHYSICAL EXAM--------------------------------------    Constitutional/General: Alert and oriented x 1  Head: Normocephalic and atraumatic  Eyes: PERRL, EOMI, sclera non icteric  Mouth: Oropharynx clear, handling secretions, no trismus, no asymmetry of the posterior oropharynx or uvular edema  Neck: Supple, full ROM, no stridor, no meningeal signs  Respiratory: Lungs clear to auscultation bilaterally, no wheezes, rales, or rhonchi. Not in respiratory distress  Cardiovascular:  Regular rate. Regular rhythm. 2+ distal pulses. Equal extremity pulses. Chest: No chest wall tenderness  GI:  Abdomen Soft, Non tender, Non distended. No rebound, guarding, or rigidity. No pulsatile masses. Musculoskeletal: Moves all extremities x 4. NIH is 1 for confusion  Integument: skin warm and dry. No rashes. Neurologic: GCS 15, no focal deficits, symmetric strength 5/5 in the upper and lower extremities bilaterally  Psychiatric: Normal Affect          -------------------------------------------------- RESULTS -------------------------------------------------  I have personally reviewed all laboratory and imaging results for this patient. Results are listed below.      LABS: (Lab results interpreted by me)  Results for orders placed or performed during the hospital encounter of 01/25/23   Troponin   Result Value Ref Range    Troponin, High Sensitivity 26 (H) 0 - 11 ng/L   APTT   Result Value Ref Range    aPTT 32.6 24.5 - 35.1 sec   Protime-INR   Result Value Ref Range    Protime 14.1 (H) 9.3 - 12.4 sec    INR 1.3    CBC with Auto Differential   Result Value Ref Range    WBC 5.3 4.5 - 11.5 E9/L    RBC 6.18 (H) 3.80 - 5.80 E12/L    Hemoglobin 17.4 (H) 12.5 - 16.5 g/dL    Hematocrit 52.9 37.0 - 54.0 %    MCV 85.6 80.0 - 99.9 fL    MCH 28.2 26.0 - 35.0 pg    MCHC 32.9 32.0 - 34.5 %    RDW 13.2 11.5 - 15.0 fL    Platelets 403 704 - 881 E9/L    MPV 11.0 7.0 - 12.0 fL    Neutrophils % 64.6 43.0 - 80.0 %    Immature Granulocytes % 0.4 0.0 - 5.0 %    Lymphocytes % 25.6 20.0 - 42.0 %    Monocytes % 4.9 2.0 - 12.0 %    Eosinophils % 3.0 0.0 - 6.0 %    Basophils % 1.5 0.0 - 2.0 %    Neutrophils Absolute 3.41 1.80 - 7.30 E9/L    Immature Granulocytes # 0.02 E9/L    Lymphocytes Absolute 1.35 (L) 1.50 - 4.00 E9/L    Monocytes Absolute 0.26 0.10 - 0.95 E9/L    Eosinophils Absolute 0.16 0.05 - 0.50 E9/L    Basophils Absolute 0.08 0.00 - 0.20 E9/L   Comprehensive Metabolic Panel   Result Value Ref Range    Sodium 137 132 - 146 mmol/L    Potassium 4.1 3.5 - 5.0 mmol/L    Chloride 94 (L) 98 - 107 mmol/L    CO2 18 (L) 22 - 29 mmol/L    Anion Gap 25 (H) 7 - 16 mmol/L    Glucose 124 (H) 74 - 99 mg/dL    BUN 16 6 - 23 mg/dL    Creatinine 1.2 0.7 - 1.2 mg/dL    Est, Glom Filt Rate >60 >=60 mL/min/1.73    Calcium 10.3 (H) 8.6 - 10.2 mg/dL    Total Protein 7.5 6.4 - 8.3 g/dL    Albumin 4.7 3.5 - 5.2 g/dL    Total Bilirubin 0.8 0.0 - 1.2 mg/dL    Alkaline Phosphatase 70 40 - 129 U/L    ALT 21 0 - 40 U/L    AST 27 0 - 39 U/L   Lactic Acid   Result Value Ref Range    Lactic Acid 3.4 (H) 0.5 - 2.2 mmol/L   Serum Drug Screen   Result Value Ref Range    Ethanol Lvl <10 mg/dL    Acetaminophen Level <5.0 (L) 10.0 - 51.1 mcg/mL    Salicylate, Serum <5.6 0.0 - 30.0 mg/dL   EKG 12 Lead   Result Value Ref Range    Ventricular Rate 63 BPM    Atrial Rate 63 BPM    P-R Interval 156 ms    QRS Duration 98 ms    Q-T Interval 442 ms    QTc Calculation (Bazett) 452 ms    P Axis 72 degrees    R Axis -36 degrees    T Axis 96 degrees   ,       RADIOLOGY:  Interpreted by Radiologist unless otherwise specified  CT HEAD WO CONTRAST   Final Result   1. There is hemorrhage within the right frontal lobe which could represent   intraparenchymal hemorrhage or hemorrhagic mass. There is surrounding edema   with mass effect and no midline shift. 2. Possible non hemorrhagic mass in the left frontal lobe. 3. Chronic small vessel ischemic disease. Findings discussed with Dr. Rita Gibbons via telephone on 01/25/2023 at 1501 hours   [facility] Standard time. RECOMMENDATIONS:   Recommend MRI brain without and with intravenous contrast.         XR CHEST PORTABLE   Final Result   No acute process.                    ------------------------- NURSING NOTES AND VITALS REVIEWED ---------------------------   The nursing notes within the ED encounter and vital signs as below have been reviewed by myself  BP (!) 161/96   Pulse 84   Temp 97.7 °F (36.5 °C)   Resp 22   Ht 6' 3\" (1.905 m)   Wt 190 lb (86.2 kg)   SpO2 96%   BMI 23.75 kg/m²     Oxygen Saturation Interpretation: Normal    The patients available past medical records and past encounters were reviewed. ------------------------------ ED COURSE/MEDICAL DECISION MAKING----------------------  Medications   niCARdipine (CARDENE) 2.5 MG/ML injection (has no administration in time range)   niCARdipine (CARDENE) 25 mg in sodium chloride 0.9 % 250 mL infusion ([id number]) (7.5 mg/hr IntraVENous Rate/Dose Change 1/25/23 1515)   levETIRAcetam (KEPPRA) 1000 mg/100 mL IVPB (0 mg IntraVENous Stopped 1/25/23 1439)           The cardiac monitor revealed sinus with a heart rate in the 80s as interpreted by me. The cardiac monitor was ordered secondary to the patient's head bleed and to monitor the patient for dysrhythmia. CPT [id number]         Medical Decision Making:        Patient presents with syncope and confusion. Concern for hemorrhage, stroke, concussion, among other pathologies. He was sent from an outside facility. He was hypertensive, his Cardene was continued. He was mild confused, no other findings. Labs interpreted by me shows a CBC that is normal, normal serum drug screen, troponin is 26, CMP is unremarkable other than a chloride of 94 and a CO2 of 18. Head CT reviewed by myself shows a frontal hematoma mass with hemorrhage. Radiology agrees. Chart reviewed. Patient was admitted in 2/25/2019. Based on the H&P by the hospitalist at that time, patient was alert and oriented x3, he had no confusion. Confusion today seems to be acute. Discussed with Dr. Lauri Caban, after his review, he is concerned for metastatic disease. MRI or is ordered and pending. Reevaluation, patient's resting. No symptoms or complaints. Discussed with the hospitalist, patient admitted to the ICU for further evaluation and work-up. Re-Evaluations:  ED Course as of 01/25/23 1557   Wed Jan 25, 2023   1454 Discussed findings with radiology concern for mass versus intracranial hemorrhage of the right frontal lobe. [JN]   0630 Patient was started on 1000 mg of Keppra. He was also started on nicardipine titrated to keep his systolic blood pressure below 160. [JN]   4128 Consultation was made with Dr. Iris Young at [facility] emergency department for stat trauma transfer. [JN]   9294 Labs were reviewed and interpreted by me. CBC was 5.3. Hemoglobin 17.4. Platelet count was 315. BMP showed metabolic acidosis with a bicarb of 18 anion gap of 25. His glucose was 142 g/dL. Calcium was 10. Alcohol levels less than 10 lactic acid is 3.4. [JN]      ED Course User Index  [JN] Alberto Yanez,          Counseling: The emergency provider has spoken with the patient and discussed todays results, in addition to providing specific details for the plan of care and counseling regarding the diagnosis and prognosis. Questions are answered at this time and they are agreeable with the plan.       --------------------------------- IMPRESSION AND DISPOSITION ---------------------------------    IMPRESSION  1. Intracranial hemorrhage ([address]    2. Disorientation        DISPOSITION  Disposition: Admit to CCU/ICU  Patient condition is stable        NOTE: This report was transcribed using voice recognition software.  Every effort was made to ensure accuracy; however, inadvertent computerized transcription errors may be present        Tonja Acharya MD  01/28/23 0227

## 2023-01-25 NOTE — ED NOTES
Cardene titrated to 10mg/HR  based on titration guidelines.     Julia Farnsworth RN  01/25/23 1870

## 2023-01-25 NOTE — ED PROVIDER NOTES
HPI:  1/25/23, Time: 1:18 PM PETRONA Diggs is a 59 y.o. male presenting to the ED for Near syncopal event while walking ouside, EMS he was found outside a restaurant. Onset unknown ago. The complaint has been constant, severe in severity, and worsened by nothing. Patient seems confused disoriented. Responds to his name only. Cannot answer questions. Review of Systems:   A complete review of systems was performed and pertinent positives and negatives are stated within HPI, all other systems reviewed and are negative.    --------------------------------------------- PAST HISTORY ---------------------------------------------  Past Medical History:  has no past medical history on file. Past Surgical History:  has a past surgical history that includes Colonoscopy. Social History:  reports that he has been smoking. He has been smoking an average of 1 pack per day. He has never used smokeless tobacco. He reports current alcohol use. He reports current drug use. Drug: Marijuana Renner Fogo). Family History: family history is not on file. The patients home medications have been reviewed. Allergies: Patient has no known allergies.     -------------------------------------------------- RESULTS -------------------------------------------------  All laboratory and radiology results have been personally reviewed by myself   LABS:  Results for orders placed or performed during the hospital encounter of 01/25/23   Troponin   Result Value Ref Range    Troponin, High Sensitivity 26 (H) 0 - 11 ng/L   APTT   Result Value Ref Range    aPTT 32.6 24.5 - 35.1 sec   Protime-INR   Result Value Ref Range    Protime 14.1 (H) 9.3 - 12.4 sec    INR 1.3    CBC with Auto Differential   Result Value Ref Range    WBC 5.3 4.5 - 11.5 E9/L    RBC 6.18 (H) 3.80 - 5.80 E12/L    Hemoglobin 17.4 (H) 12.5 - 16.5 g/dL    Hematocrit 52.9 37.0 - 54.0 %    MCV 85.6 80.0 - 99.9 fL    MCH 28.2 26.0 - 35.0 pg    MCHC 32.9 32.0 - 34.5 %    RDW 13.2 11.5 - 15.0 fL    Platelets 450 680 - 037 E9/L    MPV 11.0 7.0 - 12.0 fL    Neutrophils % 64.6 43.0 - 80.0 %    Immature Granulocytes % 0.4 0.0 - 5.0 %    Lymphocytes % 25.6 20.0 - 42.0 %    Monocytes % 4.9 2.0 - 12.0 %    Eosinophils % 3.0 0.0 - 6.0 %    Basophils % 1.5 0.0 - 2.0 %    Neutrophils Absolute 3.41 1.80 - 7.30 E9/L    Immature Granulocytes # 0.02 E9/L    Lymphocytes Absolute 1.35 (L) 1.50 - 4.00 E9/L    Monocytes Absolute 0.26 0.10 - 0.95 E9/L    Eosinophils Absolute 0.16 0.05 - 0.50 E9/L    Basophils Absolute 0.08 0.00 - 0.20 E9/L   Comprehensive Metabolic Panel   Result Value Ref Range    Sodium 137 132 - 146 mmol/L    Potassium 4.1 3.5 - 5.0 mmol/L    Chloride 94 (L) 98 - 107 mmol/L    CO2 18 (L) 22 - 29 mmol/L    Anion Gap 25 (H) 7 - 16 mmol/L    Glucose 124 (H) 74 - 99 mg/dL    BUN 16 6 - 23 mg/dL    Creatinine 1.2 0.7 - 1.2 mg/dL    Est, Glom Filt Rate >60 >=60 mL/min/1.73    Calcium 10.3 (H) 8.6 - 10.2 mg/dL    Total Protein 7.5 6.4 - 8.3 g/dL    Albumin 4.7 3.5 - 5.2 g/dL    Total Bilirubin 0.8 0.0 - 1.2 mg/dL    Alkaline Phosphatase 70 40 - 129 U/L    ALT 21 0 - 40 U/L    AST 27 0 - 39 U/L   Lactic Acid   Result Value Ref Range    Lactic Acid 3.4 (H) 0.5 - 2.2 mmol/L   Serum Drug Screen   Result Value Ref Range    Ethanol Lvl <10 mg/dL    Acetaminophen Level <5.0 (L) 10.0 - 00.7 mcg/mL    Salicylate, Serum <7.5 0.0 - 30.0 mg/dL   EKG 12 Lead   Result Value Ref Range    Ventricular Rate 63 BPM    Atrial Rate 63 BPM    P-R Interval 156 ms    QRS Duration 98 ms    Q-T Interval 442 ms    QTc Calculation (Bazett) 452 ms    P Axis 72 degrees    R Axis -36 degrees    T Axis 96 degrees       RADIOLOGY:  Interpreted by Radiologist.  CT HEAD WO CONTRAST   Final Result   1. There is hemorrhage within the right frontal lobe which could represent   intraparenchymal hemorrhage or hemorrhagic mass. There is surrounding edema   with mass effect and no midline shift.    2. Possible non hemorrhagic mass in the left frontal lobe. 3. Chronic small vessel ischemic disease. Findings discussed with Dr. Rita Gibbons via telephone on 01/25/2023 at 1501 hours   [facility] Standard time. RECOMMENDATIONS:   Recommend MRI brain without and with intravenous contrast.         XR CHEST PORTABLE   Final Result   No acute process. ------------------------- NURSING NOTES AND VITALS REVIEWED ---------------------------   The nursing notes within the ED encounter and vital signs as below have been reviewed. BP (!) 170/88   Pulse 80   Temp (!) 96.1 °F (35.6 °C) (Axillary)   Resp 14   Ht 6' 3\" (1.905 m)   Wt 190 lb (86.2 kg)   SpO2 98%   BMI 23.75 kg/m²   Oxygen Saturation Interpretation: Normal      ---------------------------------------------------PHYSICAL EXAM--------------------------------------      Constitutional/General: Alert and oriented x1 tp person only, patient is cold to touch and in appearance  Head: Normocephalic and atraumatic  Eyes: PERRL, EOMI  Mouth: Oropharynx clear, handling secretions, no trismus  Neck: Supple, full ROM,   Pulmonary: Lungs clear to auscultation bilaterally, no wheezes, rales, or rhonchi. Not in respiratory distress  Cardiovascular:  Regular rate and rhythm, no murmurs, gallops, or rubs. 2+ distal pulses  Abdomen: Soft, non tender, non distended,   Extremities: Moves all extremities x 4.  Warm and well perfused  Skin: cold to touch  Neurologic: GCS 13   Psych: Normal Affect    ------------------------------ ED COURSE/MEDICAL DECISION MAKING----------------------  Medications   niCARdipine (CARDENE) 2.5 MG/ML injection (has no administration in time range)   niCARdipine (CARDENE) 25 mg in sodium chloride 0.9 % 250 mL infusion ([id number]) (5 mg/hr IntraVENous New Bag 1/25/23 1438)   levETIRAcetam (KEPPRA) 1000 mg/100 mL IVPB (0 mg IntraVENous Stopped 1/25/23 1439)       EKG #1:  Interpreted by emergency department attending physician unless otherwise noted.    1/25/23  Time: 1324    Rhythm: normal sinus   Rate: 60-70  Axis: left  Conduction: normal  ST Segments: nonspecific changes  T Waves: inversion in  lateral leads    Clinical Impression: myocardial ischemia  Comparison to Prior tracings:  Today's ECG is unchanged from previous tracings. Medical Decision Making:    Presents found outside a restaurant today in the snow. Unknown if he fell and hit his head with the circumstances of his altered mental status. Patient understands name only. His initial GCS was 13. History From: Limited from EMS and Patient    CC/HPI Summary, DDx, ED Course, Reassessment, Tests Considered, Patient expectation:   Patient was found outside in the snow. He is hypothermic with a core temperature 96.1. Patient sent for CT scan of his head demonstrated right frontal lobe intracranial hemorrhage with vasogenic edema. Concern for mass with bleeding or acute intracranial hemorrhage. Patient initial blood pressure was 185/102. He was started on Cardene drip to keep his systolic pressure below 687. Social Determinants affecting Dx or Tx: Homeless? Chronic Conditions: CHF    Records Reviewed: Reviewed previous ER records in EMR records patient had admission to the hospital in [facility] February 2019 for pulmonary nodules and congestive heart failure. I am the Primary Clinician of Record. ED COURSE:  ED Course as of 01/25/23 1510   Wed Jan 25, 2023   1454 Discussed findings with radiology concern for mass versus intracranial hemorrhage of the right frontal lobe. [JN]   4238 Patient was started on 1000 mg of Keppra. He was also started on nicardipine titrated to keep his systolic blood pressure below 160. [JN]   2999 Consultation was made with Dr. Cherelle Mendoza at [facility] emergency department for stat trauma transfer. [JN]   1260 Labs were reviewed and interpreted by me. CBC was 5.3. Hemoglobin 17.4. Platelet count was 251.   BMP showed metabolic acidosis with a bicarb of 18 anion gap of 25. His glucose was 142 g/dL. Calcium was 10. Alcohol levels less than 10 lactic acid is 3.4. [JN]      ED Course User Index  [JN] Kirill Eduardo DO       CRITICAL CARE:   30 MINUTES. Please note that the withdrawal or failure to initiate urgent interventions for this patient would likely result in a life threatening deterioration or permanent disability. Accordingly this patient received the above mentioned time, excluding separately billable procedures. Counseling: The emergency provider has spoken with the patient and discussed todays results, in addition to providing specific details for the plan of care and counseling regarding the diagnosis and prognosis. Questions are answered at this time and they are agreeable with the plan.      --------------------------------- IMPRESSION AND DISPOSITION ---------------------------------    IMPRESSION  1. Intracranial hemorrhage ([address]    2. Disorientation        DISPOSITION  Disposition: Admit to CCU/ICU at [facility]  Patient condition is serious      NOTE: This report was transcribed using voice recognition software.  Every effort was made to ensure accuracy; however, inadvertent computerized transcription errors may be present       Kirill Eduardo DO  01/25/23 9765

## 2023-01-26 ENCOUNTER — APPOINTMENT (OUTPATIENT)
Dept: CT IMAGING | Age: 58
DRG: 064 | End: 2023-01-26
Payer: COMMERCIAL

## 2023-01-26 PROBLEM — I16.1 HYPERTENSIVE EMERGENCY: Status: ACTIVE | Noted: 2023-01-26

## 2023-01-26 PROBLEM — I62.9 INTRACRANIAL HEMORRHAGE (HCC): Status: ACTIVE | Noted: 2023-01-26

## 2023-01-26 PROBLEM — F10.20 ALCOHOL DEPENDENCE (HCC): Status: ACTIVE | Noted: 2023-01-26

## 2023-01-26 LAB
ACETAMINOPHEN LEVEL: <5 MCG/ML (ref 10–30)
ALBUMIN SERPL-MCNC: 4 G/DL (ref 3.5–5.2)
ALP BLD-CCNC: 59 U/L (ref 40–129)
ALT SERPL-CCNC: 23 U/L (ref 0–40)
AMPHETAMINE SCREEN, URINE: NOT DETECTED
ANION GAP SERPL CALCULATED.3IONS-SCNC: 15 MMOL/L (ref 7–16)
AST SERPL-CCNC: 27 U/L (ref 0–39)
BARBITURATE SCREEN URINE: NOT DETECTED
BENZODIAZEPINE SCREEN, URINE: NOT DETECTED
BILIRUB SERPL-MCNC: 0.9 MG/DL (ref 0–1.2)
BILIRUBIN URINE: ABNORMAL
BLOOD, URINE: NEGATIVE
BUN BLDV-MCNC: 12 MG/DL (ref 6–23)
CALCIUM IONIZED: 1.28 MMOL/L (ref 1.15–1.33)
CALCIUM SERPL-MCNC: 9.2 MG/DL (ref 8.6–10.2)
CANNABINOID SCREEN URINE: NOT DETECTED
CHLORIDE BLD-SCNC: 100 MMOL/L (ref 98–107)
CHOLESTEROL, TOTAL: 120 MG/DL (ref 0–199)
CLARITY: CLEAR
CO2: 23 MMOL/L (ref 22–29)
COCAINE METABOLITE SCREEN URINE: NOT DETECTED
COLOR: YELLOW
CREAT SERPL-MCNC: 0.8 MG/DL (ref 0.7–1.2)
ETHANOL: <10 MG/DL (ref 0–0.08)
FENTANYL SCREEN, URINE: NOT DETECTED
GFR SERPL CREATININE-BSD FRML MDRD: >60 ML/MIN/1.73
GLUCOSE BLD-MCNC: 85 MG/DL (ref 74–99)
GLUCOSE URINE: NEGATIVE MG/DL
HCT VFR BLD CALC: 47.4 % (ref 37–54)
HDLC SERPL-MCNC: 49 MG/DL
HEMOGLOBIN: 15.7 G/DL (ref 12.5–16.5)
KETONES, URINE: 40 MG/DL
LDL CHOLESTEROL CALCULATED: 57 MG/DL (ref 0–99)
LEUKOCYTE ESTERASE, URINE: NEGATIVE
Lab: NORMAL
MAGNESIUM: 1.7 MG/DL (ref 1.6–2.6)
MCH RBC QN AUTO: 28.3 PG (ref 26–35)
MCHC RBC AUTO-ENTMCNC: 33.1 % (ref 32–34.5)
MCV RBC AUTO: 85.4 FL (ref 80–99.9)
METHADONE SCREEN, URINE: NOT DETECTED
NITRITE, URINE: NEGATIVE
OPIATE SCREEN URINE: NOT DETECTED
OXYCODONE URINE: NOT DETECTED
PDW BLD-RTO: 13 FL (ref 11.5–15)
PH UA: 6 (ref 5–9)
PHENCYCLIDINE SCREEN URINE: NOT DETECTED
PHOSPHORUS: 2.2 MG/DL (ref 2.5–4.5)
PLATELET # BLD: 217 E9/L (ref 130–450)
PMV BLD AUTO: 11.6 FL (ref 7–12)
POTASSIUM SERPL-SCNC: 4.2 MMOL/L (ref 3.5–5)
PROTEIN UA: NEGATIVE MG/DL
RBC # BLD: 5.55 E12/L (ref 3.8–5.8)
SALICYLATE, SERUM: <0.3 MG/DL (ref 0–30)
SODIUM BLD-SCNC: 138 MMOL/L (ref 132–146)
SPECIFIC GRAVITY UA: 1.01 (ref 1–1.03)
TOTAL PROTEIN: 6.1 G/DL (ref 6.4–8.3)
TRICYCLIC ANTIDEPRESSANTS SCREEN SERUM: NEGATIVE NG/ML
TRIGL SERPL-MCNC: 68 MG/DL (ref 0–149)
TROPONIN, HIGH SENSITIVITY: 34 NG/L (ref 0–11)
TSH SERPL DL<=0.05 MIU/L-ACNC: 1.98 UIU/ML (ref 0.27–4.2)
UROBILINOGEN, URINE: 1 E.U./DL
VLDLC SERPL CALC-MCNC: 14 MG/DL
WBC # BLD: 6.8 E9/L (ref 4.5–11.5)

## 2023-01-26 PROCEDURE — 84484 ASSAY OF TROPONIN QUANT: CPT

## 2023-01-26 PROCEDURE — 2580000003 HC RX 258: Performed by: STUDENT IN AN ORGANIZED HEALTH CARE EDUCATION/TRAINING PROGRAM

## 2023-01-26 PROCEDURE — 80307 DRUG TEST PRSMV CHEM ANLYZR: CPT

## 2023-01-26 PROCEDURE — 70460 CT HEAD/BRAIN W/DYE: CPT

## 2023-01-26 PROCEDURE — 99223 1ST HOSP IP/OBS HIGH 75: CPT | Performed by: INTERNAL MEDICINE

## 2023-01-26 PROCEDURE — 36415 COLL VENOUS BLD VENIPUNCTURE: CPT

## 2023-01-26 PROCEDURE — 84100 ASSAY OF PHOSPHORUS: CPT

## 2023-01-26 PROCEDURE — 84443 ASSAY THYROID STIM HORMONE: CPT

## 2023-01-26 PROCEDURE — 6360000004 HC RX CONTRAST MEDICATION: Performed by: RADIOLOGY

## 2023-01-26 PROCEDURE — 83735 ASSAY OF MAGNESIUM: CPT

## 2023-01-26 PROCEDURE — 6370000000 HC RX 637 (ALT 250 FOR IP): Performed by: SURGERY

## 2023-01-26 PROCEDURE — 6370000000 HC RX 637 (ALT 250 FOR IP): Performed by: STUDENT IN AN ORGANIZED HEALTH CARE EDUCATION/TRAINING PROGRAM

## 2023-01-26 PROCEDURE — 81003 URINALYSIS AUTO W/O SCOPE: CPT

## 2023-01-26 PROCEDURE — 99291 CRITICAL CARE FIRST HOUR: CPT | Performed by: SURGERY

## 2023-01-26 PROCEDURE — 82330 ASSAY OF CALCIUM: CPT

## 2023-01-26 PROCEDURE — 2000000000 HC ICU R&B

## 2023-01-26 PROCEDURE — 85027 COMPLETE CBC AUTOMATED: CPT

## 2023-01-26 PROCEDURE — 80053 COMPREHEN METABOLIC PANEL: CPT

## 2023-01-26 PROCEDURE — 6360000002 HC RX W HCPCS: Performed by: STUDENT IN AN ORGANIZED HEALTH CARE EDUCATION/TRAINING PROGRAM

## 2023-01-26 PROCEDURE — 80061 LIPID PANEL: CPT

## 2023-01-26 RX ORDER — ALBUTEROL SULFATE 2.5 MG/3ML
2.5 SOLUTION RESPIRATORY (INHALATION) EVERY 4 HOURS PRN
Status: DISCONTINUED | OUTPATIENT
Start: 2023-01-26 | End: 2023-01-30 | Stop reason: HOSPADM

## 2023-01-26 RX ORDER — LEVETIRACETAM 500 MG/1
500 TABLET ORAL 2 TIMES DAILY
Status: DISCONTINUED | OUTPATIENT
Start: 2023-01-26 | End: 2023-01-30 | Stop reason: HOSPADM

## 2023-01-26 RX ADMIN — Medication 250 MG: at 21:07

## 2023-01-26 RX ADMIN — Medication 10 ML: at 07:52

## 2023-01-26 RX ADMIN — LEVETIRACETAM 500 MG: 5 INJECTION INTRAVENOUS at 12:10

## 2023-01-26 RX ADMIN — HYDRALAZINE HYDROCHLORIDE 10 MG: 20 INJECTION INTRAMUSCULAR; INTRAVENOUS at 14:05

## 2023-01-26 RX ADMIN — METOPROLOL TARTRATE 25 MG: 25 TABLET, FILM COATED ORAL at 07:52

## 2023-01-26 RX ADMIN — Medication 10 ML: at 12:07

## 2023-01-26 RX ADMIN — ACETAMINOPHEN 650 MG: 325 TABLET ORAL at 07:52

## 2023-01-26 RX ADMIN — HYDRALAZINE HYDROCHLORIDE 10 MG: 20 INJECTION INTRAMUSCULAR; INTRAVENOUS at 12:06

## 2023-01-26 RX ADMIN — ACETAMINOPHEN 650 MG: 325 TABLET ORAL at 13:37

## 2023-01-26 RX ADMIN — Medication 10 ML: at 09:23

## 2023-01-26 RX ADMIN — LEVETIRACETAM 500 MG: 5 INJECTION INTRAVENOUS at 00:07

## 2023-01-26 RX ADMIN — LISINOPRIL 10 MG: 10 TABLET ORAL at 07:52

## 2023-01-26 RX ADMIN — SODIUM CHLORIDE 500 ML: 9 INJECTION, SOLUTION INTRAVENOUS at 00:04

## 2023-01-26 RX ADMIN — HYDRALAZINE HYDROCHLORIDE 10 MG: 20 INJECTION INTRAMUSCULAR; INTRAVENOUS at 21:06

## 2023-01-26 RX ADMIN — ACETAMINOPHEN 650 MG: 325 TABLET ORAL at 01:34

## 2023-01-26 RX ADMIN — LEVETIRACETAM 500 MG: 500 TABLET, FILM COATED ORAL at 21:07

## 2023-01-26 RX ADMIN — IOPAMIDOL 75 ML: 755 INJECTION, SOLUTION INTRAVENOUS at 09:23

## 2023-01-26 RX ADMIN — Medication 10 ML: at 21:06

## 2023-01-26 RX ADMIN — METOPROLOL TARTRATE 25 MG: 25 TABLET, FILM COATED ORAL at 23:06

## 2023-01-26 ASSESSMENT — PAIN SCALES - GENERAL
PAINLEVEL_OUTOF10: 0

## 2023-01-26 NOTE — FLOWSHEET NOTE
Patient ambulated to toilet to void; gait steady. Back to bed without difficulty; encouraged patient to use call light for any assistance needed.

## 2023-01-26 NOTE — CONSULTS
Surgical Intensive Care Unit  Consult Note    Date of admission:  1/25/2023    Reason for ICU transfer: Intraparenchymal hemorrhage secondary to hypertensive urgency versus mass    23-year-old male presented to the emergency room secondary to a near syncopal episode while walking outside earlier today. On presentation patient was mildly disoriented and was unable to fully answer questions. Patient underwent a CT of the head that showed intraparenchymal hemorrhage secondary to hypertensive urgency versus possible mass. Moved over to CVICU, currently GCS is 15 and answering questions appropriately. All cranial nerves appear to be in tact. No current facility-administered medications on file prior to encounter. Current Outpatient Medications on File Prior to Encounter   Medication Sig Dispense Refill    metoprolol succinate (TOPROL XL) 25 MG extended release tablet Take 1 tablet by mouth daily 30 tablet 11    lisinopril (PRINIVIL;ZESTRIL) 10 MG tablet Take 1 tablet by mouth daily 30 tablet 11    furosemide (LASIX) 20 MG tablet Take 1 tablet by mouth daily 30 tablet 11    aspirin 81 MG chewable tablet Take 1 tablet by mouth daily 30 tablet 0    nitroGLYCERIN (NITROSTAT) 0.4 MG SL tablet up to max of 3 total doses. If no relief after 3 doses, call 911. 25 tablet 0    albuterol sulfate  (90 Base) MCG/ACT inhaler Inhale 2 puffs into the lungs 4 times daily as needed for Wheezing 1 Inhaler 0    aclidinium (TUDORZA PRESSAIR) 400 MCG/ACT AEPB inhaler Inhale 1 puff into the lungs 2 times daily 1 each 1     No Known Allergies    Past Surgical History:   Procedure Laterality Date    COLONOSCOPY       History reviewed. No pertinent past medical history. Hospital course:  1/25/23  -patient with altered mental status/near syncopal episode earlier today, presented with intraparenchymal hemorrhage with concern of possible mass versus hypertensive urgency.   Currently on Cardene      Physical Exam:  BP (!) 152/86   Pulse 90   Temp 97.9 °F (36.6 °C) (Oral)   Resp 20   Ht 6' 3\" (1.905 m)   Wt 190 lb (86.2 kg)   SpO2 98%   BMI 23.75 kg/m²   CONSTITUTIONAL:  awake, alert, cooperative, no apparent distress, and appears stated age  EYES:  Lids and lashes normal, pupils equal, round and reactive to light, extra ocular muscles intact, sclera clear, conjunctiva normal  LUNGS:  No increased work of breathing, good air exchange, clear to auscultation bilaterally, no crackles or wheezing  CARDIOVASCULAR:  regular rate and rhythm  ABDOMEN:  No scars, normal bowel sounds, soft, non-distended, non-tender, no masses palpated, no hepatosplenomegally  NEUROLOGIC:  Awake, alert, oriented to name, place and time. Cranial nerves II-XII are grossly intact. Motor is 5 out of 5 bilaterally. Cerebellar finger to nose, heel to shin intact. Sensory is intact.   Babinski down going, Romberg negative, and gait is normal.    ASSESSMENT / PLAN:  Neuro:  Intraparenchymal hemorrhage secondary to hemorrhagic urgency versus mass   Neurosurgery following --appreciate recommendations  MRI brain pending  Continue Keppra until MRI is complete--if just showing intraparenchymal hemorrhage okay to stop seizure prophylaxis  Pain -Tylenol  CV: Hypertension-currently on Cardene drip, will add as needed labetalol/hydralazine  Will restart home metoprolol, and lisinopril  Goal SBP less then 140    monitor hemodynamics  Pulm: No acute issues-  Monitor RR, SpO2 above 92%  GI:  No acute issues, passed bedside swallow okay for clear liquid diet  Renal: No acute issues-no acute indication for Sorenson placement, monitor UOP  ID:  No acute indication for any antibiotics-  Monitor for SIRS  Endo: No acute issues-  Monitor glucose  MSK: No acute issues, PT OT when able    Total fluid rate: No IV fluids, okay for clear liquid diet  Bowel regimen: GlycoLax  DVT proph:  None  Ancillary consults: Internal medicine, neurosurgery  Family Update: As able Sherryle Chi, DO  PGY-3  General Surgery

## 2023-01-26 NOTE — PLAN OF CARE
Problem: Chronic Conditions and Co-morbidities  Goal: Patient's chronic conditions and co-morbidity symptoms are monitored and maintained or improved  Outcome: Progressing     Problem: Discharge Planning  Goal: Discharge to home or other facility with appropriate resources  Outcome: Progressing     Problem: ABCDS Injury Assessment  Goal: Absence of physical injury  Outcome: Progressing     Problem: Safety - Adult  Goal: Free from fall injury  Outcome: Progressing     Problem: Pain  Goal: Verbalizes/displays adequate comfort level or baseline comfort level  Outcome: Progressing Unknown if ever smoked

## 2023-01-26 NOTE — PROGRESS NOTES
[facility]  PROGRESS NOTE  ATTENDING NOTE    CRITICAL CARE    Chief Complaint   Patient presents with    Other     PATIENT C/O NEAR SYNCOPE PER EMS. PATIENT A&O X 1 PERSON ONLY. HPI  Intraparenchymal hemorrhage secondary to hypertensive urgency versus mass     78-year-old male presented to the emergency room secondary to a near syncopal episode while walking outside earlier today. On presentation patient was mildly disoriented and was unable to fully answer questions. Patient underwent a CT of the head that showed intraparenchymal hemorrhage secondary to hypertensive urgency versus possible mass. Moved over to CVICU, currently GCS is 15 and answering questions appropriately. All cranial nerves appear to be in tact. Patient Active Problem List   Diagnosis    Congestive heart failure of unknown etiology ([address]    Sinus tachycardia by electrocardiogram    Abnormal QT interval present on electrocardiogram    Acute diastolic (congestive) heart failure (HCC)    Precordial pain    Essential hypertension    Tobacco abuse    Intracranial bleed ([address]    Intracranial hemorrhage ([address]       OVERNIGHT EVENTS:  Admitted to [address]:  1/25: admitted to CVICU, cardene gtt  1/26:  cardene gtt off    BP (!) 146/105   Pulse 72   Temp 97.8 °F (36.6 °C) (Temporal)   Resp 20   Ht 6' 3\" (1.905 m)   Wt 190 lb (86.2 kg)   SpO2 97%   BMI 23.75 kg/m²   Physical Exam  Constitutional:       Appearance: Normal appearance. HENT:      Head: Normocephalic and atraumatic. Nose: Nose normal.      Mouth/Throat:      Mouth: Mucous membranes are moist.      Pharynx: Oropharynx is clear. Eyes:      Extraocular Movements: Extraocular movements intact. Pupils: Pupils are equal, round, and reactive to light. Cardiovascular:      Rate and Rhythm: Normal rate and regular rhythm. Pulses: Normal pulses. Heart sounds: Normal heart sounds.    Pulmonary:      Effort: Pulmonary effort is normal.      Breath sounds: Normal breath sounds. Abdominal:      General: There is no distension. Palpations: Abdomen is soft. Tenderness: There is no abdominal tenderness. Musculoskeletal:         General: No tenderness or signs of injury. Cervical back: Normal range of motion and neck supple. Skin:     General: Skin is warm and dry. Neurological:      General: No focal deficit present. Mental Status: He is alert and oriented to person, place, and time. Psychiatric:         Mood and Affect: Mood normal.         Behavior: Behavior normal.         Thought Content: Thought content normal.         Judgment: Judgment normal.       Lines: Sorenson:  no  Central line:  no  PICC:  no    I have reviewed the laboratory studies    CXR:  clear    ASSESSMENT/PLAN:   Neuro: right frontal brain mass--NSGY following, MRI brain; if met needs CT C/A/P  Cardio:  HTN--lisinopril, metoprolol  Respiratory: No active issues   GI:  tolerating diet  FEN: hypophosphatemia--replace   Renal:  No active issues  Heme:  No active issues   Endocrine:  keep glucose <180  ID: No active issues      DVT/GI ppx:  bilateral SCDs, diet    CC TIME:  I spent 33 min managing this patients critical issues which are a constant threat to life excluding time teaching and performing procedures.       Julia Wild MD, MSc, FACS  1/26/2023  5:57 PM

## 2023-01-26 NOTE — FLOWSHEET NOTE
Patient still has not urinated during this shift. I informed the patient that I would scan his bladder again. Patient became uncommunicative and refused to cooperate; he refused to lay on his back for the scan. He became increasingly agitated as I tried to provide him with rationales for the procedure. Dr. Andrea Galeana informed, will continue to monitor.

## 2023-01-26 NOTE — H&P
[id number] [facility]  Internal Medicine Residency Program  History and Physical    Patient:  Francis Torrez 59 y.o. male MRN: [id number]     Date of Service: 1/25/2023    Hospital Day: 1      Chief complaint: had concerns including Other (PATIENT C/O NEAR SYNCOPE PER EMS. PATIENT A&O X 1 PERSON ONLY.). History Obtained From:  Patient    Date of Admission:   History of Present Illness   Francis Torrez is a 59 y.o. male  with PMH of uncontrolled HTN, tobacco use, HFrEF, COPD with no PFTs on file who was brought to the emergency department by EMS with concerns of near syncopal episode. According to the patient he was in his usual state of health until later today when he went to buy cigarettes and pizza something changed. Of note, the patient reports that when he arrived at the counter he could not explain to the  what he needed, he started to repeat the same thing over and over and then he stopped talking. According to him it lasted 10 to 20 minutes, he could not explain what he happened at that time. According to the patient prior to this episode there was no emotional trigger, dizziness, nausea, vomiting, vision loss, chest pain, weakness, head trauma, palpitations, headache. They decided to call EMS and he was transported to the ED for further management. According to the patient, although he was diagnosed with high blood pressure in past he has not been taking any blood pressure medicine over the last few months because he does not follow-up with his PCP. He reports that he has not taken any blood thinner recently. The patient also reports that he recently broke up with his ex and it is having a toll on him and feels like he is not himself anymore. When seen for evaluation the patient reported no headache, no syncope or near syncope, no vertigo, no tinnitus, no palpitations, no chest pain. ED Course:  In the emergency department the patient was hemodynamically stable with initial vitals of /100 mmHg, temp 96.1, respiratory rate 14, pulse 68, SPO2 97% on room air. BMP was significant for metabolic acidosis with bicarb of 18, anion gap 25, , lactic acid 3.4. High-sensitivity troponin at 26. Liver profile unremarkable. CBC showed hemoglobin of 17.4. CT head without contrast showed hemorrhage in the right frontal lobe, surrounding edema with mass-effect and no midline shift. Possible nonhemorrhagic stroke in the left frontal lobe. Chronic small vessel disease. ED Meds: Patient was given Keppra 1000, nicardipine drip was started. ED Fluids: None    Previous Hospital Admission: Hospitalized approximately 3 years ago for acute heart failure and acute hypoxemic respiratory failure. Past Medical History:   History reviewed. No pertinent past medical history. Past Surgical History:        Procedure Laterality Date    COLONOSCOPY         Medications Prior to Admission:    Prior to Admission medications    Medication Sig Start Date End Date Taking? Authorizing Provider   metoprolol succinate (TOPROL XL) 25 MG extended release tablet Take 1 tablet by mouth daily 4/8/19   Anay Rainey MD   lisinopril (PRINIVIL;ZESTRIL) 10 MG tablet Take 1 tablet by mouth daily 4/8/19   Anay Rainey MD   furosemide (LASIX) 20 MG tablet Take 1 tablet by mouth daily 4/8/19 5/8/19  Anay Rainey MD   aspirin 81 MG chewable tablet Take 1 tablet by mouth daily 2/28/19   Serenity Bae DO   nitroGLYCERIN (NITROSTAT) 0.4 MG SL tablet up to max of 3 total doses. If no relief after 3 doses, call 911. 2/27/19   Serenity Bae DO   albuterol sulfate  (90 Base) MCG/ACT inhaler Inhale 2 puffs into the lungs 4 times daily as needed for Wheezing 2/27/19   Serenity Bae DO   aclidinium [facility]) 400 MCG/ACT AEPB inhaler Inhale 1 puff into the lungs 2 times daily 2/27/19   Serenity Bae DO       Allergies:  Patient has no known allergies.     Social History:   TOBACCO:   reports that he has been smoking. He has been smoking an average of 1 pack per day. He has never used smokeless tobacco.  ETOH:   reports current alcohol use. OCCUPATION: Unemployed    Family History:   History reviewed. No pertinent family history. REVIEW OF SYSTEMS:    Review of Systems   Constitutional:  Positive for appetite change. HENT: Negative. Eyes: Negative. Respiratory: Negative. Cardiovascular: Negative. Gastrointestinal: Negative. Endocrine: Negative. Musculoskeletal: Negative. Skin: Negative. Allergic/Immunologic: Negative. Neurological: Negative. Hematological: Negative. Psychiatric/Behavioral: Negative. Physical Exam   Vitals: BP (!) 152/86   Pulse 90   Temp 97.9 °F (36.6 °C) (Oral)   Resp 20   Ht 6' 3\" (1.905 m)   Wt 190 lb (86.2 kg)   SpO2 98%   BMI 23.75 kg/m²     Physical Exam  Constitutional:       General: He is not in acute distress. Appearance: Normal appearance. He is normal weight. HENT:      Head: Normocephalic and atraumatic. Eyes:      General: No scleral icterus. Right eye: No discharge. Left eye: No discharge. Extraocular Movements: Extraocular movements intact. Conjunctiva/sclera: Conjunctivae normal.      Pupils: Pupils are equal, round, and reactive to light. Cardiovascular:      Rate and Rhythm: Normal rate and regular rhythm. Pulses: Normal pulses. Heart sounds: Normal heart sounds. No murmur heard. No friction rub. Abdominal:      General: Abdomen is flat. Bowel sounds are normal. There is no distension. Palpations: Abdomen is soft. There is no mass. Tenderness: There is no abdominal tenderness. There is no right CVA tenderness, left CVA tenderness, guarding or rebound. Hernia: No hernia is present. Musculoskeletal:         General: Normal range of motion. Skin:     Coloration: Skin is not jaundiced. Neurological:      General: No focal deficit present. Mental Status: He is alert and oriented to person, place, and time. Mental status is at baseline. GCS: GCS eye subscore is 4. GCS verbal subscore is 5. GCS motor subscore is 6. Cranial Nerves: Cranial nerves 2-12 are intact. No cranial nerve deficit, dysarthria or facial asymmetry. Sensory: Sensation is intact. No sensory deficit. Motor: Motor function is intact. No weakness. Coordination: Coordination is intact. Coordination normal. Finger-Nose-Finger Test and Heel to [facility] Test normal.      Deep Tendon Reflexes: Babinski sign absent on the right side. Babinski sign absent on the left side. Reflex Scores:       Tricep reflexes are 3+ on the right side. Bicep reflexes are 3+ on the right side. Comments:        Psychiatric:         Mood and Affect: Mood normal.         Behavior: Behavior normal.         Judgment: Judgment normal.       Labs and Imaging Studies   Basic Labs  Recent Labs     01/25/23  1335      K 4.1   CL 94*   CO2 18*   BUN 16   CREATININE 1.2   GLUCOSE 124*   CALCIUM 10.3*       Recent Labs     01/25/23  1335   WBC 5.3   RBC 6.18*   HGB 17.4*   HCT 52.9   MCV 85.6   MCH 28.2   MCHC 32.9   RDW 13.2      MPV 11.0       Imaging Studies:     CT HEAD WO CONTRAST    Result Date: 1/25/2023  EXAMINATION: CT OF THE HEAD WITHOUT CONTRAST  1/25/2023 2:12 pm TECHNIQUE: CT of the head was performed without the administration of intravenous contrast. Automated exposure control, iterative reconstruction, and/or weight based adjustment of the mA/kV was utilized to reduce the radiation dose to as low as reasonably achievable. COMPARISON: None. HISTORY: ORDERING SYSTEM PROVIDED HISTORY: ams TECHNOLOGIST PROVIDED HISTORY: Has a \"code stroke\" or \"stroke alert\" been called? ->No Reason for exam:->ams Decision Support Exception - unselect if not a suspected or confirmed emergency medical condition->Emergency Medical Condition (MA) FINDINGS: There are hypodensities within the raad. There is patchy and confluent hypoattenuation within the white matter of the bilateral cerebral hemispheres. There is hyperdense hemorrhage in the right frontal lobe which measures approximately 2.9 cm in size. There is surrounding edema with mass effect and effacement of the adjacent sulci. There is mild mass effect on the anterior horn of the right lateral ventricle. There is no midline shift. There is a hypodensity within the left thalamus. There is hypodensity within the anterior corpus callosum. There is possible hypodense intra-axial mass in the left frontal lobe which measures approximately 2.9 cm in size. There is no additional evidence of mass. There are no extra-axial fluid collections. The calvarium is intact. There is mild mucosal thickening within the right frontal sinus. The remaining visualized paranasal sinuses and mastoid air cells are clear. There is a small sclerotic focus within the right parietal calvarium. 1. There is hemorrhage within the right frontal lobe which could represent intraparenchymal hemorrhage or hemorrhagic mass. There is surrounding edema with mass effect and no midline shift. 2. Possible non hemorrhagic mass in the left frontal lobe. 3. Chronic small vessel ischemic disease. Findings discussed with Dr. Ran Frye via telephone on 01/25/2023 at 1501 hours [facility] Standard time. RECOMMENDATIONS: Recommend MRI brain without and with intravenous contrast.     XR CHEST PORTABLE    Result Date: 1/25/2023  EXAMINATION: ONE XRAY VIEW OF THE CHEST 1/25/2023 1:25 pm COMPARISON: None. HISTORY: ORDERING SYSTEM PROVIDED HISTORY: ams TECHNOLOGIST PROVIDED HISTORY: Reason for exam:->ams FINDINGS: The lungs are without acute focal process. There is no effusion or pneumothorax. The cardiomediastinal silhouette is without acute process. The osseous structures are without acute process. No acute process.      Resident's Assessment and Plan Assessment:  Intraparenchymal hemorrhage likely 2/2 hypertensive etiology, possible mass lesion  Reports that he has not been taking any medication for months  Episode of near syncope reported   Low threshold for deterioration  Elevate head of the bed, neurovascular checks  Currently stable neurologically speaking   Continue to monitor blood pressure  Avoid anticoagulation/antiplatelets  Continue with Cardene drip and titrate as needed  Continue with Keppra   Follow UDS/SDS  Follow neurosurgery recs  Follow MRI brain       High anion gap metabolic acidosis likely 2/2 dehydration vs poor oral intake  Continue to monitor BMP  Trend lactic acid until <2      Hx of COPD -stable  No PFT on file  Albuterol and aclidinium inhalers on med list  Currently not taking     Hx of HTN  Med list reviewed  Resume lisinopril and metoprolol  PRNs in ordered as well   Continue to monitor blood pressure in the setting of intraparenchymal hemorrhage    Hx of HFpEF  EF 60-65% (TTE 2019)  Currently euvolemic -stable    History of tobacco abuse  Reports that he smokes 1 pack a day  Not willing to quit    PT/OT: Not indicated at this time  DVT ppx: PCD's  GI ppx: Not indicated at this time      Code Status: Full code    Malka Biggs MD, PGY-1  Attending physician: Dr. Melodie Sweeney        NOTE: This report was transcribed using voice recognition software. Every effort was made to ensure accuracy; however, inadvertent computerized transcription errors may be present.

## 2023-01-26 NOTE — CONSULTS
Neurosurgery Consult Note      CHIEF COMPLAINT:     HPI: Right frontal lobe hemorrhage    . Simón Colunga is a 59 y.o. male  with PMH of uncontrolled HTN, tobacco use, HFrEF, COPD with no PFTs on file who was brought to the emergency department by EMS with concerns of near syncopal episode. According to the patient he was in his usual state of health until later today when he went to buy cigarettes and pizza something changed. Of note, the patient reports that when he arrived at the counter he could not explain to the  what he needed, he started to repeat the same thing over and over and then he stopped talking. According to him it lasted 10 to 20 minutes, he could not explain what he happened at that time. According to the patient prior to this episode there was no emotional trigger, dizziness, nausea, vomiting, vision loss, chest pain, weakness, head trauma, palpitations, headache. They decided to call EMS and he was transported to the ED for further management. According to the patient, although he was diagnosed with high blood pressure in past he has not been taking any blood pressure medicine over the last few months because he does not follow-up with his PCP. He reports that he has not taken any blood thinner recently. The patient also reports that he recently broke up with his ex and it is having a toll on him and feels like he is not himself anymore. When seen for evaluation the patient reported no headache, no syncope or near syncope, no vertigo, no tinnitus, no palpitations, no chest pain. Patient was evaluated in CVICU sitting upright head of bed elevated awake following commands seem to be pleasantly confused but oriented to name and place pupils were equally round reactive light extract movements were full    History reviewed. No pertinent past medical history. Past Surgical History:   Procedure Laterality Date    COLONOSCOPY       Patient has no known allergies.   Prior to Admission medications    Medication Sig Start Date End Date Taking? Authorizing Provider   metoprolol succinate (TOPROL XL) 25 MG extended release tablet Take 1 tablet by mouth daily 4/8/19   Mimi Hayes MD   lisinopril (PRINIVIL;ZESTRIL) 10 MG tablet Take 1 tablet by mouth daily 4/8/19   Mimi Hayes MD   furosemide (LASIX) 20 MG tablet Take 1 tablet by mouth daily 4/8/19 5/8/19  Mimi Hayes MD   aspirin 81 MG chewable tablet Take 1 tablet by mouth daily 2/28/19   Cadence Us, DO   nitroGLYCERIN (NITROSTAT) 0.4 MG SL tablet up to max of 3 total doses. If no relief after 3 doses, call 911. 2/27/19   Cadence Us, DO   albuterol sulfate  (90 Base) MCG/ACT inhaler Inhale 2 puffs into the lungs 4 times daily as needed for Wheezing 2/27/19   Cadence Us, DO   aclidinium [facility]) 400 MCG/ACT AEPB inhaler Inhale 1 puff into the lungs 2 times daily 2/27/19   Cadence Us, DO     No outpatient medications have been marked as taking for the 1/25/23 encounter [facility] Encounter). Social History     Socioeconomic History    Marital status: Single     Spouse name: Not on file    Number of children: Not on file    Years of education: Not on file    Highest education level: Not on file   Occupational History    Not on file   Tobacco Use    Smoking status: Every Day     Packs/day: 1.00     Types: Cigarettes    Smokeless tobacco: Never   Substance and Sexual Activity    Alcohol use: Yes     Comment: rare    Drug use: Yes     Types: Marijuana Karina Bishnu)    Sexual activity: Not on file   Other Topics Concern    Not on file   Social History Narrative    Not on file     Social Determinants of Health     Financial Resource Strain: Not on file   Food Insecurity: Not on file   Transportation Needs: Not on file   Physical Activity: Not on file   Stress: Not on file   Social Connections: Not on file   Intimate Partner Violence: Not on file   Housing Stability: Not on file     History reviewed.  No pertinent family history. ROS: Complete 10 point ROS was obtained with positives in HPI, otherwise:  Pt denies fevers, denies chills. Pt denies chest pain, denies SOB, denies nausea, denies vomiting, denies headache. VITALS/DRAINS:   VITALS:  BP (!) 170/82   Pulse 70   Temp 97.8 °F (36.6 °C) (Temporal)   Resp 19   Ht 6' 3\" (1.905 m)   Wt 190 lb (86.2 kg)   SpO2 96%   BMI 23.75 kg/m²   24HR INTAKE/OUTPUT:    Intake/Output Summary (Last 24 hours) at 1/26/2023 1445  Last data filed at 1/26/2023 1415  Gross per 24 hour   Intake 820 ml   Output 275 ml   Net 545 ml       EXAMINATION:  Cranial Nerves: Motor:  Sensory:  Cerebellar:  Gait:  DTRs:    IMAGING STUDIES:  CT HEAD WO CONTRAST    Result Date: 1/25/2023  EXAMINATION: CT OF THE HEAD WITHOUT CONTRAST  1/25/2023 2:12 pm TECHNIQUE: CT of the head was performed without the administration of intravenous contrast. Automated exposure control, iterative reconstruction, and/or weight based adjustment of the mA/kV was utilized to reduce the radiation dose to as low as reasonably achievable. COMPARISON: None. HISTORY: ORDERING SYSTEM PROVIDED HISTORY: ams TECHNOLOGIST PROVIDED HISTORY: Has a \"code stroke\" or \"stroke alert\" been called? ->No Reason for exam:->ams Decision Support Exception - unselect if not a suspected or confirmed emergency medical condition->Emergency Medical Condition (MA) FINDINGS: There are hypodensities within the raad. There is patchy and confluent hypoattenuation within the white matter of the bilateral cerebral hemispheres. There is hyperdense hemorrhage in the right frontal lobe which measures approximately 2.9 cm in size. There is surrounding edema with mass effect and effacement of the adjacent sulci. There is mild mass effect on the anterior horn of the right lateral ventricle. There is no midline shift. There is a hypodensity within the left thalamus. There is hypodensity within the anterior corpus callosum.   There is possible hypodense intra-axial mass in the left frontal lobe which measures approximately 2.9 cm in size. There is no additional evidence of mass. There are no extra-axial fluid collections. The calvarium is intact. There is mild mucosal thickening within the right frontal sinus. The remaining visualized paranasal sinuses and mastoid air cells are clear. There is a small sclerotic focus within the right parietal calvarium. 1. There is hemorrhage within the right frontal lobe which could represent intraparenchymal hemorrhage or hemorrhagic mass. There is surrounding edema with mass effect and no midline shift. 2. Possible non hemorrhagic mass in the left frontal lobe. 3. Chronic small vessel ischemic disease. Findings discussed with Dr. Shashi Ngo via telephone on 01/25/2023 at 1501 hours [place] Standard time. RECOMMENDATIONS: Recommend MRI brain without and with intravenous contrast.     CT HEAD W CONTRAST    Result Date: 1/26/2023  EXAMINATION: CT OF THE HEAD WITH CONTRAST  1/26/2023 9:21 am TECHNIQUE: CT of the head/brain was performed with the administration of intravenous contrast. Multiplanar reformatted images are provided for review. Automated exposure control, iterative reconstruction, and/or weight based adjustment of the mA/kV was utilized to reduce the radiation dose to as low as reasonably achievable. COMPARISON: 01/25/2022 CT brain. HISTORY: ORDERING SYSTEM PROVIDED HISTORY: Possible mass TECHNOLOGIST PROVIDED HISTORY: Reason for exam:->Possible mass What reading provider will be dictating this exam?->CRC FINDINGS: BRAIN/VENTRICLES: Similar-appearing peripherally enhancing right frontal intraparenchymal hemorrhage with associated mild mass effect. No suggestion of active extravasation. Similar-appearing peripherally enhancing left frontal hypodense area without significant associated mass effect. No significant midline shift. No ventriculomegaly. No large territorial hypodensity.   Involutional parenchymal changes with suggestion of severe microvascular ischemic disease. ORBITS: The visualized portion of the orbits demonstrate no acute abnormality. SINUSES: The visualized paranasal sinuses and mastoid air cells demonstrate no acute abnormality. SOFT TISSUES/SKULL:  No acute abnormality of the visualized skull or soft tissues. 1. Similar-appearing peripherally enhancing right frontal intraparenchymal hemorrhage with associated mild mass effect. No suggestion of active extravasation. 2. Similar-appearing peripherally enhancing left frontal hypodense area. No significant associated mass effect. RECOMMENDATIONS: If clinical concerns persist, can consider further evaluation with contrast-enhanced MRI if no contraindications. XR CHEST PORTABLE    Result Date: 1/25/2023  EXAMINATION: ONE XRAY VIEW OF THE CHEST 1/25/2023 1:25 pm COMPARISON: None. HISTORY: ORDERING SYSTEM PROVIDED HISTORY: ams TECHNOLOGIST PROVIDED HISTORY: Reason for exam:->ams FINDINGS: The lungs are without acute focal process. There is no effusion or pneumothorax. The cardiomediastinal silhouette is without acute process. The osseous structures are without acute process. No acute process.        LABS:  CBC:   Lab Results   Component Value Date/Time    WBC 6.8 01/26/2023 05:20 AM    RBC 5.55 01/26/2023 05:20 AM    HGB 15.7 01/26/2023 05:20 AM    HCT 47.4 01/26/2023 05:20 AM    MCV 85.4 01/26/2023 05:20 AM    MCH 28.3 01/26/2023 05:20 AM    MCHC 33.1 01/26/2023 05:20 AM    RDW 13.0 01/26/2023 05:20 AM     01/26/2023 05:20 AM    MPV 11.6 01/26/2023 05:20 AM     BMP:    Lab Results   Component Value Date/Time     01/26/2023 05:20 AM    K 4.2 01/26/2023 05:20 AM     01/26/2023 05:20 AM    CO2 23 01/26/2023 05:20 AM    BUN 12 01/26/2023 05:20 AM    LABALBU 4.0 01/26/2023 05:20 AM    CREATININE 0.8 01/26/2023 05:20 AM    CALCIUM 9.2 01/26/2023 05:20 AM    GFRAA >60 02/25/2019 10:40 AM    LABGLOM >60 01/26/2023 05:20 AM    GLUCOSE 85 01/26/2023 05:20 AM     PT/INR:    Lab Results   Component Value Date/Time    PROTIME 14.1 01/25/2023 01:35 PM    INR 1.3 01/25/2023 01:35 PM       IMPRESSION: Right frontal lobe hemorrhage likely subacute scattered white matter hypodensities visualized on the CT scan    RECOMMENDATIONS: If able we will try to do an MRI of the head with and without gadolinium dose anticoagulation is contraindicated follow with you    Thank you again for this consultation.       Darnell Mars MD  1/26/2023

## 2023-01-26 NOTE — PLAN OF CARE
Problem: Chronic Conditions and Co-morbidities  Goal: Patient's chronic conditions and co-morbidity symptoms are monitored and maintained or improved  1/26/2023 0731 by Justyna Tanner RN  Outcome: Progressing  Flowsheets (Taken 1/26/2023 0731)  Care Plan - Patient's Chronic Conditions and Co-Morbidity Symptoms are Monitored and Maintained or Improved: Monitor and assess patient's chronic conditions and comorbid symptoms for stability, deterioration, or improvement     Problem: Discharge Planning  Goal: Discharge to home or other facility with appropriate resources  1/26/2023 0731 by Justyna Tanner RN  Outcome: Progressing  Flowsheets (Taken 1/26/2023 0933)  Discharge to home or other facility with appropriate resources: Identify barriers to discharge with patient and caregiver     Problem: ABCDS Injury Assessment  Goal: Absence of physical injury  1/26/2023 0731 by Justyna Tanner RN  Outcome: Progressing  Flowsheets (Taken 1/26/2023 0731)  Absence of Physical Injury: Implement safety measures based on patient assessment     Problem: Safety - Adult  Goal: Free from fall injury  1/26/2023 0731 by Justyna Tanner RN  Outcome: Progressing  Flowsheets (Taken 1/26/2023 0731)  Free From Fall Injury: Instruct family/caregiver on patient safety     Problem: Pain  Goal: Verbalizes/displays adequate comfort level or baseline comfort level  1/26/2023 0731 by Justyna Tanner RN  Outcome: Progressing  Flowsheets (Taken 1/26/2023 0731)  Verbalizes/displays adequate comfort level or baseline comfort level:   Encourage patient to monitor pain and request assistance   Assess pain using appropriate pain scale

## 2023-01-26 NOTE — PROGRESS NOTES
Dr. Nguyen Novoa at bedside, order Stat CT head with contrast. [id number] Fang Osborn to hold off on MRI at this time.  Leonor Whittaker RN

## 2023-01-26 NOTE — PROGRESS NOTES
[facility] Residency Program  Progress Note - House Team 1    Patient:  Henrique Kearns 59 y.o. male MRN: [id number]     Date of Service: 1/26/2023     CC: Aphasia   Overnight events: None      Subjective     Seen and examined at bedside this morning, no complaints. He was awake and alert oriented, denied headache dizziness, numbness. BP improved this morning now off cardene drip. Mentation still waxing and waning per nursing staff. Objective     Physical Exam:  Vitals: /81   Pulse (!) 48   Temp 98.3 °F (36.8 °C) (Temporal)   Resp 16   Ht 6' 3\" (1.905 m)   Wt 190 lb (86.2 kg)   SpO2 96%   BMI 23.75 kg/m²     I & O - 24hr: No intake/output data recorded. General Appearance: alert, appears stated age, and cooperative  HEENT:  Head: Normal, normocephalic, atraumatic. Neck: no adenopathy, no carotid bruit, no JVD, supple, symmetrical, trachea midline, and thyroid not enlarged, symmetric, no tenderness/mass/nodules  Lung: clear to auscultation bilaterally  Heart: regular rate and rhythm, S1, S2 normal, no murmur, click, rub or gallop  Abdomen: soft, non-tender; bowel sounds normal; no masses,  no organomegaly  Extremities:  extremities normal, atraumatic, no cyanosis or edema  Musculokeletal: No joint swelling, no muscle tenderness. ROM normal in all joints of extremities.    Neurologic: Mental status: Alert, oriented, thought content appropriate although sometimes confused, no focal deficit, reflexes intact with no babinski  Subject  Pertinent Labs & Imaging Studies   miri  CBC:   Lab Results   Component Value Date/Time    WBC 6.8 01/26/2023 05:20 AM    RBC 5.55 01/26/2023 05:20 AM    HGB 15.7 01/26/2023 05:20 AM    HCT 47.4 01/26/2023 05:20 AM    MCV 85.4 01/26/2023 05:20 AM    MCH 28.3 01/26/2023 05:20 AM    MCHC 33.1 01/26/2023 05:20 AM    RDW 13.0 01/26/2023 05:20 AM     01/26/2023 05:20 AM    MPV 11.6 01/26/2023 05:20 AM     CMP:    Lab Results Component Value Date/Time     01/26/2023 05:20 AM    K 4.2 01/26/2023 05:20 AM     01/26/2023 05:20 AM    CO2 23 01/26/2023 05:20 AM    BUN 12 01/26/2023 05:20 AM    CREATININE 0.8 01/26/2023 05:20 AM    GFRAA >60 02/25/2019 10:40 AM    LABGLOM >60 01/26/2023 05:20 AM    GLUCOSE 85 01/26/2023 05:20 AM    PROT 6.1 01/26/2023 05:20 AM    LABALBU 4.0 01/26/2023 05:20 AM    CALCIUM 9.2 01/26/2023 05:20 AM    BILITOT 0.9 01/26/2023 05:20 AM    ALKPHOS 59 01/26/2023 05:20 AM    AST 27 01/26/2023 05:20 AM    ALT 23 01/26/2023 05:20 AM       Resident's Assessment and Plan     Albert Cardenas is a 59 y.o. male with a PMHx of HFpEF (EF of 50-55%, HTN, Tobacco abuse, alcohol abuse who initially came in for concerns for aphasia. He was found to have a 2.9 mm hyperdensity in the right frontal lobe and a hypodensity in in the left thalamic region. He was admitted to the NICU for closer monitoring. He is being managed for the following:     Acute encephalopathy 2/2 to right frontal lobe hemorrhage, left thalamic hypodensity concerning for mass    HAGMA 2/2 to lactic acidosis    Elevated troponin   Hx of HFpEF EF of 50-55% on ECHO in 2019   Hx of HTN - previously on lisinopril amd metoprolol however he has been non-compliant   Hx of Tobacco abuse - 30 ppd smoking hx.    Hx of alcohol abuse - per patient last drink was 2 weeks ago     Plan:   - Overall management per [facility]   - Off cardene drip, continue to monitor blood pressure   - Now on lisinopril   - Serial neurochecks   - MRI of the brain pending   - on Keprra 500 mg q12 for seizure prophylaxis   - Metoprolol 25 mg BID     DVT prophylaxis/ GI prophylaxis: holding off anticoagulation 2/2 to hemorrhage/ patient on a diet   Disposition: continue current care, pending PT/OT eval and clinical course may need acute rehab facility     Contreras Phillip MD, PGY-3  Attending physician: Dr. Jael Freedman

## 2023-01-26 NOTE — FLOWSHEET NOTE
Patient has not voided at all this shift from 1900 to 2300. When asked, patient is unsure of when he voided last. Palpation of bladder was soft and did not elicit an urge to void. Bladder scan showed 287 cc of urine in bladder. Dr. Yifan Bueno notified and orders received.

## 2023-01-26 NOTE — CARE COORDINATION
Pt admitted with  intraparenchymal hemorrhage secondary to hypertensive urgency versus possible mass. Met with pt. He is alert, but not verbalizing. He is answering questions by nodding head and holding fingers up for numbers. Info that  I was able to obtain is that he lives alone in a 3rd floor apt with only stair access, 3 flights with rails. He does not use any assistive devices to to ambulate. When asked if Pcp is Dr Willy Contreras as listed in chart, he shrugged shoulders as in not knowing. Asked if he has family that we can list as contacts, he shook his head \"no\". Will likely need Pt/Ot/Speech evals ( for cognitive eval) once plan of care is determined. Brain MRI pending.

## 2023-01-26 NOTE — PROGRESS NOTES
SURGICAL INTENSIVE CARE  PROGRESS NOTE    Date of admission:  1/25/2023  Reason for ICU transfer:  Intraparenchymal hemorrhage secondary to hypertensive urgency versus mass    SUBJECTIVE:     58-year-old male presented to the emergency room secondary to a near syncopal episode while walking outside earlier today.  On presentation patient was mildly disoriented and was unable to fully answer questions.  Patient underwent a CT of the head that showed intraparenchymal hemorrhage secondary to hypertensive urgency versus possible mass.         HOSPITAL COURSE:  1/25/23  -patient with altered mental status/near syncopal episode earlier today, presented with intraparenchymal hemorrhage with concern of possible mass versus hypertensive urgency.  Currently on Cardene  1/26 repeat head CT with similar findings to previous.  No increasing hemorrhage seen on image.  MRI brain ordered.      PHYSICAL EXAM:  BP (!) 165/93   Pulse 57   Temp 97.8 °F (36.6 °C) (Temporal)   Resp 16   Ht 6' 3\" (1.905 m)   Wt 190 lb (86.2 kg)   SpO2 96%   BMI 23.75 kg/m²     CONSTITUTIONAL:  awake, alert, cooperative, no apparent distress, and appears stated age  EYES:  Lids and lashes normal, pupils equal, round and reactive to light, extra ocular muscles intact, sclera clear, conjunctiva normal  LUNGS:  No increased work of breathing, good air exchange, clear to auscultation bilaterally, no crackles or wheezing  CARDIOVASCULAR:  regular rate and rhythm  ABDOMEN:  No scars, normal bowel sounds, soft, non-distended, non-tender, no masses palpated, no hepatosplenomegally  NEUROLOGIC:  Awake, alert, oriented to name, place and time.  Cranial nerves II-XII are grossly intact.  Motor is 5 out of 5 bilaterally.  Cerebellar finger to nose, heel to shin intact.  Sensory is intact.  Babinski down going     ASSESSMENT / PLAN:  Neuro:            Intraparenchymal hemorrhage secondary to hemorrhagic urgency versus mass   Neurosurgery following --appreciate  recommendations  MRI brain pending  Continue Keppra until MRI is complete--if showing intraparenchymal hemorrhage okay to stop seizure prophylaxis  Pain -Tylenol  CV:      Hypertension-currently off Cardene drip. labetalol/hydralazine prn. home metoprolol and lisinopril. Goal SBP less then 140   . monitor hemodynamics  Pulm:  No acute issues. Atrovent 4 times daily  Monitor RR, SpO2 above 92%  GI:   No acute issues, okay for clear liquid diet  Renal: No acute issues. Patient denies Lasix use at home  ID:  No acute indication for any antibiotics-  Monitor for SIRS  Endo: Patient denies history of diabetes.   Monitor glucose  MSK:   No acute issues, PT OT when able     Total fluid rate:          No IV fluids, tolerating clear liquid diet  Bowel regimen:         GlycoLax  DVT proph:                None  Ancillary consults:    Internal medicine, neurosurgery  Family Update:          As able    Disposition: ICU    Electronically signed by Tete Cedillo DO on 1/26/2023 at 12:40 PM

## 2023-01-26 NOTE — PROGRESS NOTES
[facility]  Internal Medicine Residency Program  Progress Note - House Team     Patient:  Willi Montes De Oca 59 y.o. male MRN: [id number]     Date of Service: 1/26/2023     CC: confusion  Overnight events: none     Subjective     Patient was seen and examined this morning at bedside in no acute distress. Overnight there were no concerns. Patient currently denies any headaches, dizziness, vision changes, chest pain, SOB, nausea/vomiting, weakness, myalgias or arthralgias. He is scheduled for an MRI this morning. Objective     Physical Exam:  Vitals: BP (!) 156/86   Pulse 56   Temp 97.8 °F (36.6 °C) (Temporal)   Resp 18   Ht 6' 3\" (1.905 m)   Wt 190 lb (86.2 kg)   SpO2 97%   BMI 23.75 kg/m²     I & O - 24hr: I/O this shift: In: 80 [P.O.:480; IV Piggyback:100]  Out: 275 [Urine:275]   General Appearance: alert, appears stated age, and cooperative  HEENT:  Head: Normocephalic, no lesions, without obvious abnormality. Eye: Normal external eye, conjunctiva, lids cornea, GERALDINE. Ears: no lesions, normal pinna  Nose: normal external nose, no epistaxis or rhinorrhea  Extremities:  extremities normal, atraumatic, no cyanosis or edema  Musculokeletal: No joint swelling, no muscle tenderness. ROM normal in all joints of extremities. Neurologic: Mental status: oriented to person and place, but not to time (first states year is 2083, then changes answer to 2053). States current president is Vikash Terry. Occasional confusion in answering questions/following instructions throughout exam, but not consistently. Quickly and correctly identifies everyday objects when pointed to (pen, chair, sink). Cranial nerves II-XII intact; however, of note patient states he cannot hear finger rub bilaterally, yet has no apparent difficulty hearing during conversation.  Strength 5/5 with flexion and extension at elbows bilaterally; 5/5 strength with flexion, extension, and abduction at shoulders bilaterally; 5/5 hand  bilaterally; 5/5 strength with flexion and extension at hips bilaterally. -Examination truncated because patient was taken for CT imaging.     Subject  Pertinent Labs & Imaging Studies   miri  CBC with Differential:    Lab Results   Component Value Date/Time    WBC 6.8 01/26/2023 05:20 AM    RBC 5.55 01/26/2023 05:20 AM    HGB 15.7 01/26/2023 05:20 AM    HCT 47.4 01/26/2023 05:20 AM     01/26/2023 05:20 AM    MCV 85.4 01/26/2023 05:20 AM    MCH 28.3 01/26/2023 05:20 AM    MCHC 33.1 01/26/2023 05:20 AM    RDW 13.0 01/26/2023 05:20 AM    LYMPHOPCT 25.6 01/25/2023 01:35 PM    MONOPCT 4.9 01/25/2023 01:35 PM    BASOPCT 1.5 01/25/2023 01:35 PM    MONOSABS 0.26 01/25/2023 01:35 PM    LYMPHSABS 1.35 01/25/2023 01:35 PM    EOSABS 0.16 01/25/2023 01:35 PM    BASOSABS 0.08 01/25/2023 01:35 PM     CMP:    Lab Results   Component Value Date/Time     01/26/2023 05:20 AM    K 4.2 01/26/2023 05:20 AM     01/26/2023 05:20 AM    CO2 23 01/26/2023 05:20 AM    BUN 12 01/26/2023 05:20 AM    CREATININE 0.8 01/26/2023 05:20 AM    GFRAA >60 02/25/2019 10:40 AM    LABGLOM >60 01/26/2023 05:20 AM    GLUCOSE 85 01/26/2023 05:20 AM    PROT 6.1 01/26/2023 05:20 AM    LABALBU 4.0 01/26/2023 05:20 AM    CALCIUM 9.2 01/26/2023 05:20 AM    BILITOT 0.9 01/26/2023 05:20 AM    ALKPHOS 59 01/26/2023 05:20 AM    AST 27 01/26/2023 05:20 AM    ALT 23 01/26/2023 05:20 AM     Magnesium:    Lab Results   Component Value Date/Time    MG 1.7 01/26/2023 05:20 AM     Phosphorus:    Lab Results   Component Value Date/Time    PHOS 2.2 01/26/2023 05:20 AM     PT/INR:    Lab Results   Component Value Date/Time    PROTIME 14.1 01/25/2023 01:35 PM    INR 1.3 01/25/2023 01:35 PM     Troponin:    Lab Results   Component Value Date/Time    TROPONINI <0.01 02/26/2019 12:54 AM     U/A:    Lab Results   Component Value Date/Time    COLORU Yellow 01/26/2023 10:50 AM    PROTEINU Negative 01/26/2023 10:50 AM    PHUR 6.0 01/26/2023 10:50 AM    CLARITYU Clear 01/26/2023 10:50 AM    SPECGRAV 1.015 01/26/2023 10:50 AM    LEUKOCYTESUR Negative 01/26/2023 10:50 AM    UROBILINOGEN 1.0 01/26/2023 10:50 AM    BILIRUBINUR SMALL 01/26/2023 10:50 AM    BLOODU Negative 01/26/2023 10:50 AM    GLUCOSEU Negative 01/26/2023 10:50 AM     TSH:    Lab Results   Component Value Date/Time    TSH 1.980 01/26/2023 05:20 AM     Urine Toxicology:  No components found for: Tequila Kevin, ICOCAINE, IMARTHC, IOPIATES, IPHENCYC    CT HEAD W CONTRAST   Final Result   1. Similar-appearing peripherally enhancing right frontal intraparenchymal   hemorrhage with associated mild mass effect. No suggestion of active   extravasation. 2. Similar-appearing peripherally enhancing left frontal hypodense area. No   significant associated mass effect. RECOMMENDATIONS:   If clinical concerns persist, can consider further evaluation with   contrast-enhanced MRI if no contraindications. CT HEAD WO CONTRAST   Final Result   1. There is hemorrhage within the right frontal lobe which could represent   intraparenchymal hemorrhage or hemorrhagic mass. There is surrounding edema   with mass effect and no midline shift. 2. Possible non hemorrhagic mass in the left frontal lobe. 3. Chronic small vessel ischemic disease. Findings discussed with Dr. Anatoliy Kunz via telephone on 01/25/2023 at 1501 hours   [place] Standard time. RECOMMENDATIONS:   Recommend MRI brain without and with intravenous contrast.         XR CHEST PORTABLE   Final Result   No acute process. MRI BRAIN W CONTRAST    (Results Pending)        Medical Student's Assessment and Plan     Assessment and Plan:    Jamaica Mckeon is a 59 y.o. male with a history of HTN with noncompliance with medications, HFrEF, COPD, and tobacco abuse who presented to the ED on 1/25/2023 for confusion and was subsequently found to have a right frontal lobe hemorrhage on CT.      Confusion secondary to right frontal lobe hemorrhage  Hemorrhage most likely result of uncontrolled HTN with noncompliance with medication as well as tobacco abuse. Neurosurgery consulted, recommended MRI. Continue Keppra 500 mg BID for seizure prophylaxis. Will continue to closely monitor BP; see below. Hx HTN  Continue lisinopril 10 mg daily  Continue metoprolol tartrate 25 mg BID  No longer on Cardene drip  Hx COPD  PFTs not on file. Stable: currently breathing comfortably on room air with good O2 saturation. Continue ipratropium 0.5 mg four times daily  Hx HFpEF  LV EF 50-55% per ECHO 2019. Goal to maintain euvolemic status. Monitor I/Os q8 hours   Hx Tobacco Abuse  Patient currently smoking 1 ppd and not interested in quitting. Will revisit topic of quitting smoking prior to discharge.        PT/OT evaluation: not indicated  DVT prophylaxis/ GI prophylaxis: not indicated given concern for bleeding  Disposition: continue current care    [place], MS3  Attending physician: Dr. John Gordon

## 2023-01-26 NOTE — PROGRESS NOTES
Dr. Sharda Hernandez notified CT complete, Stated to proceed with MRI. Called MRI to set up time, they stated it will be after 1600 and they will call back with a time closer to.  Khoi Cortez RN

## 2023-01-27 ENCOUNTER — APPOINTMENT (OUTPATIENT)
Dept: MRI IMAGING | Age: 58
DRG: 064 | End: 2023-01-27
Payer: COMMERCIAL

## 2023-01-27 LAB
ALBUMIN SERPL-MCNC: 4.1 G/DL (ref 3.5–5.2)
ALBUMIN SERPL-MCNC: 4.6 G/DL (ref 3.5–5.2)
ALP BLD-CCNC: 71 U/L (ref 40–129)
ALP BLD-CCNC: 74 U/L (ref 40–129)
ALT SERPL-CCNC: 42 U/L (ref 0–40)
ALT SERPL-CCNC: 47 U/L (ref 0–40)
ANION GAP SERPL CALCULATED.3IONS-SCNC: 13 MMOL/L (ref 7–16)
ANION GAP SERPL CALCULATED.3IONS-SCNC: 17 MMOL/L (ref 7–16)
AST SERPL-CCNC: 47 U/L (ref 0–39)
AST SERPL-CCNC: 48 U/L (ref 0–39)
BILIRUB SERPL-MCNC: 0.7 MG/DL (ref 0–1.2)
BILIRUB SERPL-MCNC: 1 MG/DL (ref 0–1.2)
BUN BLDV-MCNC: 11 MG/DL (ref 6–23)
BUN BLDV-MCNC: 8 MG/DL (ref 6–23)
CALCIUM IONIZED: 0.87 MMOL/L (ref 1.15–1.33)
CALCIUM IONIZED: 1.33 MMOL/L (ref 1.15–1.33)
CALCIUM SERPL-MCNC: 10.3 MG/DL (ref 8.6–10.2)
CALCIUM SERPL-MCNC: 9.7 MG/DL (ref 8.6–10.2)
CHLORIDE BLD-SCNC: 101 MMOL/L (ref 98–107)
CHLORIDE BLD-SCNC: 102 MMOL/L (ref 98–107)
CO2: 23 MMOL/L (ref 22–29)
CO2: 24 MMOL/L (ref 22–29)
CREAT SERPL-MCNC: 1 MG/DL (ref 0.7–1.2)
CREAT SERPL-MCNC: 1 MG/DL (ref 0.7–1.2)
GFR SERPL CREATININE-BSD FRML MDRD: >60 ML/MIN/1.73
GFR SERPL CREATININE-BSD FRML MDRD: >60 ML/MIN/1.73
GLUCOSE BLD-MCNC: 116 MG/DL (ref 74–99)
GLUCOSE BLD-MCNC: 182 MG/DL (ref 74–99)
HCT VFR BLD CALC: 49.9 % (ref 37–54)
HCT VFR BLD CALC: 55.6 % (ref 37–54)
HEMOGLOBIN: 17.1 G/DL (ref 12.5–16.5)
HEMOGLOBIN: 18.4 G/DL (ref 12.5–16.5)
MAGNESIUM: 1.8 MG/DL (ref 1.6–2.6)
MAGNESIUM: 1.8 MG/DL (ref 1.6–2.6)
MCH RBC QN AUTO: 27.9 PG (ref 26–35)
MCH RBC QN AUTO: 28.1 PG (ref 26–35)
MCHC RBC AUTO-ENTMCNC: 33.1 % (ref 32–34.5)
MCHC RBC AUTO-ENTMCNC: 34.3 % (ref 32–34.5)
MCV RBC AUTO: 81.4 FL (ref 80–99.9)
MCV RBC AUTO: 84.9 FL (ref 80–99.9)
PDW BLD-RTO: 13.2 FL (ref 11.5–15)
PDW BLD-RTO: 13.5 FL (ref 11.5–15)
PHOSPHORUS: 2 MG/DL (ref 2.5–4.5)
PHOSPHORUS: 2.4 MG/DL (ref 2.5–4.5)
PLATELET # BLD: 252 E9/L (ref 130–450)
PLATELET # BLD: 279 E9/L (ref 130–450)
PMV BLD AUTO: 10.8 FL (ref 7–12)
PMV BLD AUTO: 11.2 FL (ref 7–12)
POTASSIUM SERPL-SCNC: 3.5 MMOL/L (ref 3.5–5)
POTASSIUM SERPL-SCNC: 3.9 MMOL/L (ref 3.5–5)
RBC # BLD: 6.13 E12/L (ref 3.8–5.8)
RBC # BLD: 6.55 E12/L (ref 3.8–5.8)
SODIUM BLD-SCNC: 138 MMOL/L (ref 132–146)
SODIUM BLD-SCNC: 142 MMOL/L (ref 132–146)
TOTAL PROTEIN: 6.6 G/DL (ref 6.4–8.3)
TOTAL PROTEIN: 7.3 G/DL (ref 6.4–8.3)
WBC # BLD: 6.8 E9/L (ref 4.5–11.5)
WBC # BLD: 7.7 E9/L (ref 4.5–11.5)

## 2023-01-27 PROCEDURE — 6370000000 HC RX 637 (ALT 250 FOR IP): Performed by: SURGERY

## 2023-01-27 PROCEDURE — 36415 COLL VENOUS BLD VENIPUNCTURE: CPT

## 2023-01-27 PROCEDURE — 70553 MRI BRAIN STEM W/O & W/DYE: CPT

## 2023-01-27 PROCEDURE — 2000000000 HC ICU R&B

## 2023-01-27 PROCEDURE — 99291 CRITICAL CARE FIRST HOUR: CPT | Performed by: SURGERY

## 2023-01-27 PROCEDURE — 85027 COMPLETE CBC AUTOMATED: CPT

## 2023-01-27 PROCEDURE — 2580000003 HC RX 258: Performed by: STUDENT IN AN ORGANIZED HEALTH CARE EDUCATION/TRAINING PROGRAM

## 2023-01-27 PROCEDURE — 6360000002 HC RX W HCPCS: Performed by: SURGERY

## 2023-01-27 PROCEDURE — 99232 SBSQ HOSP IP/OBS MODERATE 35: CPT | Performed by: INTERNAL MEDICINE

## 2023-01-27 PROCEDURE — A9579 GAD-BASE MR CONTRAST NOS,1ML: HCPCS | Performed by: RADIOLOGY

## 2023-01-27 PROCEDURE — 6370000000 HC RX 637 (ALT 250 FOR IP): Performed by: STUDENT IN AN ORGANIZED HEALTH CARE EDUCATION/TRAINING PROGRAM

## 2023-01-27 PROCEDURE — 6370000000 HC RX 637 (ALT 250 FOR IP): Performed by: INTERNAL MEDICINE

## 2023-01-27 PROCEDURE — 80053 COMPREHEN METABOLIC PANEL: CPT

## 2023-01-27 PROCEDURE — 6360000002 HC RX W HCPCS: Performed by: STUDENT IN AN ORGANIZED HEALTH CARE EDUCATION/TRAINING PROGRAM

## 2023-01-27 PROCEDURE — 6360000004 HC RX CONTRAST MEDICATION: Performed by: RADIOLOGY

## 2023-01-27 PROCEDURE — 84100 ASSAY OF PHOSPHORUS: CPT

## 2023-01-27 PROCEDURE — 82330 ASSAY OF CALCIUM: CPT

## 2023-01-27 PROCEDURE — 94640 AIRWAY INHALATION TREATMENT: CPT

## 2023-01-27 PROCEDURE — 83735 ASSAY OF MAGNESIUM: CPT

## 2023-01-27 RX ORDER — CHLORPROMAZINE HYDROCHLORIDE 25 MG/1
25 TABLET, FILM COATED ORAL ONCE
Status: COMPLETED | OUTPATIENT
Start: 2023-01-27 | End: 2023-01-27

## 2023-01-27 RX ADMIN — LEVETIRACETAM 500 MG: 500 TABLET, FILM COATED ORAL at 20:56

## 2023-01-27 RX ADMIN — LEVETIRACETAM 500 MG: 500 TABLET, FILM COATED ORAL at 09:30

## 2023-01-27 RX ADMIN — Medication 250 MG: at 17:20

## 2023-01-27 RX ADMIN — Medication 10 ML: at 10:31

## 2023-01-27 RX ADMIN — Medication 250 MG: at 10:30

## 2023-01-27 RX ADMIN — HYDRALAZINE HYDROCHLORIDE 10 MG: 20 INJECTION INTRAMUSCULAR; INTRAVENOUS at 00:24

## 2023-01-27 RX ADMIN — CHLORPROMAZINE HYDROCHLORIDE 25 MG: 25 TABLET, FILM COATED ORAL at 12:28

## 2023-01-27 RX ADMIN — IPRATROPIUM BROMIDE 0.5 MG: 0.5 SOLUTION RESPIRATORY (INHALATION) at 15:34

## 2023-01-27 RX ADMIN — LISINOPRIL 10 MG: 10 TABLET ORAL at 10:30

## 2023-01-27 RX ADMIN — IPRATROPIUM BROMIDE 0.5 MG: 0.5 SOLUTION RESPIRATORY (INHALATION) at 20:08

## 2023-01-27 RX ADMIN — Medication 10 ML: at 21:30

## 2023-01-27 RX ADMIN — METOPROLOL TARTRATE 25 MG: 25 TABLET, FILM COATED ORAL at 20:56

## 2023-01-27 RX ADMIN — ACETAMINOPHEN 650 MG: 325 TABLET ORAL at 14:53

## 2023-01-27 RX ADMIN — Medication 250 MG: at 13:02

## 2023-01-27 RX ADMIN — METOPROLOL TARTRATE 25 MG: 25 TABLET, FILM COATED ORAL at 10:30

## 2023-01-27 RX ADMIN — ACETAMINOPHEN 650 MG: 325 TABLET ORAL at 06:20

## 2023-01-27 RX ADMIN — ACETAMINOPHEN 650 MG: 325 TABLET ORAL at 00:25

## 2023-01-27 RX ADMIN — HYDRALAZINE HYDROCHLORIDE 10 MG: 20 INJECTION INTRAMUSCULAR; INTRAVENOUS at 14:50

## 2023-01-27 RX ADMIN — IPRATROPIUM BROMIDE 0.5 MG: 0.5 SOLUTION RESPIRATORY (INHALATION) at 08:16

## 2023-01-27 RX ADMIN — Medication 250 MG: at 20:56

## 2023-01-27 RX ADMIN — GADOTERIDOL 20 ML: 279.3 INJECTION, SOLUTION INTRAVENOUS at 11:58

## 2023-01-27 ASSESSMENT — PAIN SCALES - GENERAL
PAINLEVEL_OUTOF10: 0

## 2023-01-27 NOTE — PROGRESS NOTES
Comprehensive Nutrition Assessment    Type and Reason for Visit:  Initial, RD Nutrition Re-Screen/LOS (ICU LOS)    Nutrition Recommendations/Plan:  Continue current diet; PO intakes % at this time, no need for ONS at this time. Will monitor. Malnutrition Assessment:  Malnutrition Status:  No malnutrition (01/27/23 1437)    Context:  Acute Illness     Findings of the 6 clinical characteristics of malnutrition:  Energy Intake:  No significant decrease in energy intake  Weight Loss:  No significant weight loss     Body Fat Loss:  No significant body fat loss     Muscle Mass Loss:  No significant muscle mass loss    Fluid Accumulation:  No significant fluid accumulation     Strength:  Not Performed    Nutrition Assessment:    Pt admitted withIntraparenchymal hemorrhage secondary to hypertensive urgency vs mass; PMH of uncontrolled HTN, tobacco use, HFrEF, COPD. Pt tolerating regular diet. Will monitor and provide nutrition recs as appropriate. Nutrition Related Findings:    Alert, oriented to person/place; I/Os WDL; abd WDL; no edema. Wound Type: None       Current Nutrition Intake & Therapies:    Average Meal Intake: %, 51-75%  Average Supplements Intake: None Ordered  ADULT DIET; Regular    Anthropometric Measures:  Height: 6' 3\" (190.5 cm)  Ideal Body Weight (IBW): 196 lbs (89 kg)       Current Body Weight: 190 lb (86.2 kg) (1/25/23 stated), 96.9 % IBW.     Current BMI (kg/m2): 23.7  Usual Body Weight:  (SARAHI: lack of measured weight)                       BMI Categories: Normal Weight (BMI 22.0 to 24.9) age over 72    Estimated Daily Nutrient Needs:  Energy Requirements Based On: Formula  Weight Used for Energy Requirements: Current  Energy (kcal/day): 7974-1676 kcal/d (MSJ 1758 x SF 1.2)  Weight Used for Protein Requirements: Ideal  Protein (g/day): 110-125 gm pro/d (1.2-1.4 gm pro/kg/d)  Method Used for Fluid Requirements: 1 ml/kcal  Fluid (ml/day): 2972-6910    Nutrition Diagnosis:   No nutrition diagnosis at this time    Nutrition Interventions:   Food and/or Nutrient Delivery: Continue Current Diet  Nutrition Education/Counseling: Education not indicated  Coordination of Nutrition Care: Continue to monitor while inpatient       Goals:     Goals: PO intake 75% or greater, by next RD assessment       Nutrition Monitoring and Evaluation:   Behavioral-Environmental Outcomes: None Identified  Food/Nutrient Intake Outcomes: Food and Nutrient Intake  Physical Signs/Symptoms Outcomes: Biochemical Data, Chewing or Swallowing, GI Status, Fluid Status or Edema, Skin, Nutrition Focused Physical Findings, Weight    Discharge Planning:     Too soon to determine     [employer] (Closed) Candido, RD  Contact: [phone]

## 2023-01-27 NOTE — PLAN OF CARE
Problem: Chronic Conditions and Co-morbidities  Goal: Patient's chronic conditions and co-morbidity symptoms are monitored and maintained or improved  Outcome: Progressing     Problem: Discharge Planning  Goal: Discharge to home or other facility with appropriate resources  Outcome: Progressing     Problem: ABCDS Injury Assessment  Goal: Absence of physical injury  Outcome: Progressing     Problem: Safety - Adult  Goal: Free from fall injury  Outcome: Progressing     Problem: Pain  Goal: Verbalizes/displays adequate comfort level or baseline comfort level  Outcome: Progressing     Problem: Neurosensory - Adult  Goal: Achieves stable or improved neurological status  Outcome: Progressing  Goal: Absence of seizures  Outcome: Progressing  Goal: Remains free of injury related to seizures activity  Outcome: Progressing  Goal: Achieves maximal functionality and self care  Outcome: Progressing

## 2023-01-27 NOTE — PROGRESS NOTES
[facility]  PROGRESS NOTE  ATTENDING NOTE    CRITICAL CARE    Chief Complaint   Patient presents with    Other     PATIENT C/O NEAR SYNCOPE PER EMS. PATIENT A&O X 1 PERSON ONLY. HPI  Intraparenchymal hemorrhage secondary to hypertensive urgency versus mass     64-year-old male presented to the emergency room secondary to a near syncopal episode while walking outside earlier today. On presentation patient was mildly disoriented and was unable to fully answer questions. Patient underwent a CT of the head that showed intraparenchymal hemorrhage secondary to hypertensive urgency versus possible mass. Moved over to CVICU, currently GCS is 15 and answering questions appropriately. All cranial nerves appear to be in tact. Patient Active Problem List   Diagnosis    Congestive heart failure of unknown etiology ([address]    Sinus tachycardia by electrocardiogram    Abnormal QT interval present on electrocardiogram    Acute diastolic (congestive) heart failure (HCC)    Precordial pain    Essential hypertension    Tobacco abuse    Intracranial bleed (HCC)    Intracranial hemorrhage ([address]    Hypertensive emergency    Alcohol dependence ([address]       OVERNIGHT EVENTS:  Has the [facility] COURSE:  1/25: admitted to CVICU, cardene gtt  1/26:  cardene gtt off  1/27:  MRI brain showing multiple bleeds    BP (!) 109/57   Pulse 76   Temp 98.3 °F (36.8 °C) (Oral)   Resp 22   Ht 6' 3\" (1.905 m)   Wt 190 lb (86.2 kg)   SpO2 93%   BMI 23.75 kg/m²   Physical Exam  Constitutional:       Appearance: Normal appearance. HENT:      Head: Normocephalic and atraumatic. Nose: Nose normal.      Mouth/Throat:      Mouth: Mucous membranes are moist.      Pharynx: Oropharynx is clear. Eyes:      Extraocular Movements: Extraocular movements intact. Pupils: Pupils are equal, round, and reactive to light. Cardiovascular:      Rate and Rhythm: Normal rate and regular rhythm.       Pulses: Normal pulses. Heart sounds: Normal heart sounds. Pulmonary:      Effort: Pulmonary effort is normal.      Breath sounds: Normal breath sounds. Abdominal:      General: There is no distension. Palpations: Abdomen is soft. Tenderness: There is no abdominal tenderness. Musculoskeletal:         General: No tenderness or signs of injury. Cervical back: Normal range of motion and neck supple. Skin:     General: Skin is warm and dry. Neurological:      General: No focal deficit present. Mental Status: He is alert and oriented to person, place, and time. Psychiatric:         Mood and Affect: Mood normal.         Behavior: Behavior normal.         Thought Content: Thought content normal.         Judgment: Judgment normal.       Lines: Sorenson:  no  Central line:  no  PICC:  no    I have reviewed the laboratory studies    CXR:  none    ASSESSMENT/PLAN:   Neuro: right frontal brain mass--NSGY following, MRI showing these are multiple bleeds; c/w keppra, keep SBP <140, monitor Na  Cardio:  HTN--lisinopril, metoprolol  Respiratory: No active issues   GI:  tolerating diet  FEN: hypophosphatemia--replace   Renal:  No active issues  Heme:  No active issues   Endocrine:  keep glucose <180  ID: No active issues      DVT/GI ppx:  bilateral SCDs, diet    CC TIME:  I spent 33 min managing this patients critical issues which are a constant threat to life excluding time teaching and performing procedures.       Ming German MD, MSc, FACS  1/27/2023  5:49 PM

## 2023-01-27 NOTE — CARE COORDINATION
Pt is oriented to person and place. Pt is for MRI brain today. Plan of care pending results. If no intervention planned, will need PT/OT orders to determine if safe to return home at discharge

## 2023-01-27 NOTE — PROGRESS NOTES
Neurosurg progress note  VITALS:  BP (!) 144/98   Pulse 75   Temp 98.3 °F (36.8 °C) (Temporal)   Resp 17   Ht 6' 3\" (1.905 m)   Wt 190 lb (86.2 kg)   SpO2 98%   BMI 23.75 kg/m²   24HR INTAKE/OUTPUT:    Intake/Output Summary (Last 24 hours) at 1/27/2023 1133  Last data filed at 1/27/2023 0600  Gross per 24 hour   Intake 580 ml   Output 500 ml   Net 80 ml     CT HEAD WO CONTRAST    Result Date: 1/25/2023  EXAMINATION: CT OF THE HEAD WITHOUT CONTRAST  1/25/2023 2:12 pm TECHNIQUE: CT of the head was performed without the administration of intravenous contrast. Automated exposure control, iterative reconstruction, and/or weight based adjustment of the mA/kV was utilized to reduce the radiation dose to as low as reasonably achievable. COMPARISON: None. HISTORY: ORDERING SYSTEM PROVIDED HISTORY: ams TECHNOLOGIST PROVIDED HISTORY: Has a \"code stroke\" or \"stroke alert\" been called? ->No Reason for exam:->ams Decision Support Exception - unselect if not a suspected or confirmed emergency medical condition->Emergency Medical Condition (MA) FINDINGS: There are hypodensities within the raad. There is patchy and confluent hypoattenuation within the white matter of the bilateral cerebral hemispheres. There is hyperdense hemorrhage in the right frontal lobe which measures approximately 2.9 cm in size. There is surrounding edema with mass effect and effacement of the adjacent sulci. There is mild mass effect on the anterior horn of the right lateral ventricle. There is no midline shift. There is a hypodensity within the left thalamus. There is hypodensity within the anterior corpus callosum. There is possible hypodense intra-axial mass in the left frontal lobe which measures approximately 2.9 cm in size. There is no additional evidence of mass. There are no extra-axial fluid collections. The calvarium is intact. There is mild mucosal thickening within the right frontal sinus.   The remaining visualized paranasal sinuses and mastoid air cells are clear. There is a small sclerotic focus within the right parietal calvarium. 1. There is hemorrhage within the right frontal lobe which could represent intraparenchymal hemorrhage or hemorrhagic mass. There is surrounding edema with mass effect and no midline shift. 2. Possible non hemorrhagic mass in the left frontal lobe. 3. Chronic small vessel ischemic disease. Findings discussed with Dr. Steve Puente via telephone on 01/25/2023 at 1501 hours [place] Standard time. RECOMMENDATIONS: Recommend MRI brain without and with intravenous contrast.     CT HEAD W CONTRAST    Result Date: 1/26/2023  EXAMINATION: CT OF THE HEAD WITH CONTRAST  1/26/2023 9:21 am TECHNIQUE: CT of the head/brain was performed with the administration of intravenous contrast. Multiplanar reformatted images are provided for review. Automated exposure control, iterative reconstruction, and/or weight based adjustment of the mA/kV was utilized to reduce the radiation dose to as low as reasonably achievable. COMPARISON: 01/25/2022 CT brain. HISTORY: ORDERING SYSTEM PROVIDED HISTORY: Possible mass TECHNOLOGIST PROVIDED HISTORY: Reason for exam:->Possible mass What reading provider will be dictating this exam?->CRC FINDINGS: BRAIN/VENTRICLES: Similar-appearing peripherally enhancing right frontal intraparenchymal hemorrhage with associated mild mass effect. No suggestion of active extravasation. Similar-appearing peripherally enhancing left frontal hypodense area without significant associated mass effect. No significant midline shift. No ventriculomegaly. No large territorial hypodensity. Involutional parenchymal changes with suggestion of severe microvascular ischemic disease. ORBITS: The visualized portion of the orbits demonstrate no acute abnormality. SINUSES: The visualized paranasal sinuses and mastoid air cells demonstrate no acute abnormality.  SOFT TISSUES/SKULL:  No acute abnormality of the visualized skull or soft tissues. 1. Similar-appearing peripherally enhancing right frontal intraparenchymal hemorrhage with associated mild mass effect. No suggestion of active extravasation. 2. Similar-appearing peripherally enhancing left frontal hypodense area. No significant associated mass effect. RECOMMENDATIONS: If clinical concerns persist, can consider further evaluation with contrast-enhanced MRI if no contraindications. XR CHEST PORTABLE    Result Date: 1/25/2023  EXAMINATION: ONE XRAY VIEW OF THE CHEST 1/25/2023 1:25 pm COMPARISON: None. HISTORY: ORDERING SYSTEM PROVIDED HISTORY: ams TECHNOLOGIST PROVIDED HISTORY: Reason for exam:->ams FINDINGS: The lungs are without acute focal process. There is no effusion or pneumothorax. The cardiomediastinal silhouette is without acute process. The osseous structures are without acute process. No acute process.      CBC:   Lab Results   Component Value Date/Time    WBC 6.8 01/27/2023 06:10 AM    RBC 6.55 01/27/2023 06:10 AM    HGB 18.4 01/27/2023 06:10 AM    HCT 55.6 01/27/2023 06:10 AM    MCV 84.9 01/27/2023 06:10 AM    MCH 28.1 01/27/2023 06:10 AM    MCHC 33.1 01/27/2023 06:10 AM    RDW 13.2 01/27/2023 06:10 AM     01/27/2023 06:10 AM    MPV 11.2 01/27/2023 06:10 AM     BMP:    Lab Results   Component Value Date/Time     01/27/2023 06:10 AM    K 3.9 01/27/2023 06:10 AM     01/27/2023 06:10 AM    CO2 23 01/27/2023 06:10 AM    BUN 8 01/27/2023 06:10 AM    LABALBU 4.6 01/27/2023 06:10 AM    CREATININE 1.0 01/27/2023 06:10 AM    CALCIUM 10.3 01/27/2023 06:10 AM    GFRAA >60 02/25/2019 10:40 AM    LABGLOM >60 01/27/2023 06:10 AM    GLUCOSE 116 01/27/2023 06:10 AM      chlorproMAZINE  25 mg Oral Once    ipratropium  0.5 mg Nebulization 4x daily    levETIRAcetam  500 mg Oral BID    potassium & sodium phosphates  1 packet Oral 4x Daily    sodium chloride flush  10 mL IntraVENous 2 times per day    acetaminophen  650 mg Oral Q6H    metoprolol tartrate  25 mg Oral BID    lisinopril  10 mg Oral Daily     Patient out of the ICU getting his brain MRI  Assessment:  Patient Active Problem List   Diagnosis    Congestive heart failure of unknown etiology ([facility]    Sinus tachycardia by electrocardiogram    Abnormal QT interval present on electrocardiogram    Acute diastolic (congestive) heart failure (HCC)    Precordial pain    Essential hypertension    Tobacco abuse    Intracranial bleed (HCC)    Intracranial hemorrhage (HCC)    Hypertensive emergency    Alcohol dependence ([facility]     Plan:Continue current care  Nabila Hudson MD M.D.

## 2023-01-27 NOTE — PROGRESS NOTES
SURGICAL INTENSIVE CARE  PROGRESS NOTE    Date of admission:  1/25/2023  Reason for ICU transfer:  Intraparenchymal hemorrhage secondary to hypertensive urgency versus mass    SUBJECTIVE:     28-year-old male presented to the emergency room secondary to a near syncopal episode while walking outside earlier today. On presentation patient was mildly disoriented and was unable to fully answer questions. Patient underwent a CT of the head that showed intraparenchymal hemorrhage secondary to hypertensive urgency versus possible mass. HOSPITAL COURSE:  1/25/23  -patient with altered mental status/near syncopal episode earlier today, presented with intraparenchymal hemorrhage with concern of possible mass versus hypertensive urgency. Currently on Cardene  1/26 repeat head CT with similar findings to previous. No increasing hemorrhage seen on image. MRI brain ordered. 1/27: MRI showing acute intracranial hemorrhage. No change in exam. GCS 15      PHYSICAL EXAM:  BP (!) 160/78   Pulse 85   Temp 98.3 °F (36.8 °C) (Oral)   Resp 20   Ht 6' 3\" (1.905 m)   Wt 190 lb (86.2 kg)   SpO2 98%   BMI 23.75 kg/m²     CONSTITUTIONAL:  awake, alert, cooperative, no apparent distress, and appears stated age  EYES:  Lids and lashes normal, pupils equal, round and reactive to light, extra ocular muscles intact, sclera clear, conjunctiva normal  LUNGS:  No increased work of breathing, good air exchange, clear to auscultation bilaterally, no crackles or wheezing  CARDIOVASCULAR:  regular rate and rhythm  ABDOMEN:  No scars, normal bowel sounds, soft, non-distended, non-tender, no masses palpated, no hepatosplenomegally  NEUROLOGIC:  Awake, alert, oriented to name, place and time. Cranial nerves II-XII are grossly intact. Motor is 5 out of 5 bilaterally. Cerebellar finger to nose, heel to shin intact. Sensory is intact.   Babinski down going     ASSESSMENT / PLAN:  Neuro:            Intraparenchymal hemorrhage. Neurosurgery following --appreciate recommendations  MRI brain pending  Continue Keppra until MRI is complete--if showing intraparenchymal hemorrhage okay to stop seizure prophylaxis  Pain -Tylenol  CV:      Hypertension-currently off Cardene drip. labetalol/hydralazine prn. home metoprolol and lisinopril. Goal SBP less then 140   . monitor hemodynamics  Pulm:  No acute issues. Atrovent 4 times daily  Monitor RR, SpO2 above 92%  GI:   No acute issues. Regular diet  Renal: No acute issues. Patient denies Lasix use at home  ID:  No acute indication for any antibiotics-  Monitor for SIRS  Endo: Patient denies history of diabetes.   Monitor glucose  MSK:   No acute issues, PT OT when able     Total fluid rate:          No IV fluids, regular note  Bowel regimen:         GlycoLax  DVT proph:                None  Ancillary consults:    Internal medicine, neurosurgery  Family Update:          As able    Disposition: ICU    Electronically signed by Gilberto Go DO on 1/27/2023 at 3:22 PM

## 2023-01-27 NOTE — FLOWSHEET NOTE
This writer spoke several times for a contact for him. States has 4 brothers, 1 sister. A \"SO SO\" relationship. Unable to provide next of kin, friend, anyone to contact.  Cant/wont provide names, numbers etc.

## 2023-01-27 NOTE — PROGRESS NOTES
[facility]  Internal Medicine Residency Program  Progress Note - House Team     Patient:  Lela Lebron 59 y.o. male MRN: [id number]     Date of Service: 1/27/2023     Days since admission: 01/25/0223    Subjective     Overnight events: This morning the patient was seen and examined at bedside in no acute distress. He denies any headache, vision changes, loss of consciousness. No major event overnight. No chest pain, nausea, no vomiting or abdominal pain. Objective     Physical Exam:  Vitals: BP (!) 160/78   Pulse 85   Temp 98.3 °F (36.8 °C) (Oral)   Resp 20   Ht 6' 3\" (1.905 m)   Wt 190 lb (86.2 kg)   SpO2 98%   BMI 23.75 kg/m²     I & O - 24hr:   Intake/Output Summary (Last 24 hours) at 1/27/2023 1502  Last data filed at 1/27/2023 0816  Gross per 24 hour   Intake 490 ml   Output 250 ml   Net 240 ml      General Appearance: alert, appears stated age, and cooperative  HEENT:  Head: Normocephalic, no lesions, without obvious abnormality. Head: Normal, normocephalic, atraumatic. Eye: Normal external eye, conjunctiva, lids cornea, GERALDINE. Lung: clear to auscultation bilaterally  Heart: regular rate and rhythm, S1, S2 normal, no murmur, click, rub or gallop  Abdomen: soft, non-tender; bowel sounds normal; no masses,  no organomegaly  Extremities:  extremities normal, atraumatic, no cyanosis or edema  Musculokeletal: No joint swelling, no muscle tenderness. ROM normal in all joints of extremities.    Neurologic: Mental status: Alert, oriented x3, thought content appropriate, strength 5/5 in both upper and lower extremities, sensation intact to light touch in both extremities and face, CN-II-XII intact, forward flexion and extension intact in both extremities, reflexes 3+ in the both upper and lower extremities     Subjective  Pertinent Information & Imaging Studies, Consults     CBC with Differential:    Lab Results   Component Value Date/Time    WBC 6.8 01/27/2023 06:10 AM    RBC 6.55 01/27/2023 06:10 AM    HGB 18.4 01/27/2023 06:10 AM    HCT 55.6 01/27/2023 06:10 AM     01/27/2023 06:10 AM    MCV 84.9 01/27/2023 06:10 AM    MCH 28.1 01/27/2023 06:10 AM    MCHC 33.1 01/27/2023 06:10 AM    RDW 13.2 01/27/2023 06:10 AM    LYMPHOPCT 25.6 01/25/2023 01:35 PM    MONOPCT 4.9 01/25/2023 01:35 PM    BASOPCT 1.5 01/25/2023 01:35 PM    MONOSABS 0.26 01/25/2023 01:35 PM    LYMPHSABS 1.35 01/25/2023 01:35 PM    EOSABS 0.16 01/25/2023 01:35 PM    BASOSABS 0.08 01/25/2023 01:35 PM     CMP:    Lab Results   Component Value Date/Time     01/27/2023 06:10 AM    K 3.9 01/27/2023 06:10 AM     01/27/2023 06:10 AM    CO2 23 01/27/2023 06:10 AM    BUN 8 01/27/2023 06:10 AM    CREATININE 1.0 01/27/2023 06:10 AM    GFRAA >60 02/25/2019 10:40 AM    LABGLOM >60 01/27/2023 06:10 AM    GLUCOSE 116 01/27/2023 06:10 AM    PROT 7.3 01/27/2023 06:10 AM    LABALBU 4.6 01/27/2023 06:10 AM    CALCIUM 10.3 01/27/2023 06:10 AM    BILITOT 1.0 01/27/2023 06:10 AM    ALKPHOS 74 01/27/2023 06:10 AM    AST 47 01/27/2023 06:10 AM    ALT 42 01/27/2023 06:10 AM       IMAGING:   Imaging Studies:    CT HEAD WO CONTRAST    Result Date: 1/25/2023  1. There is hemorrhage within the right frontal lobe which could represent intraparenchymal hemorrhage or hemorrhagic mass. There is surrounding edema with mass effect and no midline shift. 2. Possible non hemorrhagic mass in the left frontal lobe. 3. Chronic small vessel ischemic disease. Findings discussed with Dr. Yary Bae via telephone on 01/25/2023 at 1501 hours [place] Standard time. RECOMMENDATIONS: Recommend MRI brain without and with intravenous contrast.     CT HEAD W CONTRAST    Result Date: 1/26/2023  1. Similar-appearing peripherally enhancing right frontal intraparenchymal hemorrhage with associated mild mass effect. No suggestion of active extravasation. 2. Similar-appearing peripherally enhancing left frontal hypodense area. No significant associated mass effect. RECOMMENDATIONS: If clinical concerns persist, can consider further evaluation with contrast-enhanced MRI if no contraindications. XR CHEST PORTABLE    Result Date: 1/25/2023  No acute process. MRI BRAIN W WO CONTRAST    Result Date: 1/27/2023  1. Acute hemorrhage present in the right frontal lobe with mild surrounding edema. 2. Late subacute hemorrhage present in the left frontal lobe and to a lesser degree in the high right frontal lobe. No significant associated edema. 3. Involutional parenchymal changes with severe microvascular ischemic disease and scattered chronic lacunar infarcts. 4. Scattered foci of susceptibility likely represent microhemorrhages, most commonly seen in setting of hypertensive microangiopathy. 5. No evidence of acute infarct, enhancing mass lesion, midline shift, or ventriculomegaly. Notable Cultures:      Blood cultures   Blood Culture, Routine   Date Value Ref Range Status   01/25/2023 24 Hours no growth  Preliminary     Respiratory cultures No results found for: RESPCULTURE No results found for: LABGRAM  Urine No results found for: LABURIN  Legionella No results found for: LABLEGI  C Diff PCR No results found for: CDIFPCR  Wound culture/abscess: No results for input(s): WNDABS in the last 72 hours. Tip culture:No results for input(s): CXCATHTIP in the last 72 hours.     Resident's Assessment and Plan   Consults:   Neurosurgery   SICU     Assessment:  Acute intraparenchymal hemorrhage - right frontal lobe   Superimposed on late subacute hemorrhage in the left frontal lobe   Low threshold for deterioration  Elevate head of the bed, neurovascular checks  Currently stable neurologically speaking   Continue to monitor BP closely   Avoid anticoagulation/antiplatelets  Continue to monitor BP   Off of Cardene drip   Continue with Keppra   Follow neurosurgery recs     Elevated anion gap   Elevated AG - corrected normal      HTN - BP stable  Goal 140/90 mmHg   On lisinopril and metoprolol   PRNs - hydralazine and labetalol   Continue to monitor blood pressure in the setting of intraparenchymal hemorrhage    Hx of COPD -stable  No PFT on file  Albuterol and aclidinium inhalers on med list  Currently not taking     Hx of HFpEF  EF 60-65% (TTE 2019)  Currently euvolemic -stable     History of tobacco abuse  Reports that he smokes 1 pack a day  Not willing to quit       PT/OT: Not indicated at this time  DVT ppx: PCD   GI ppx: Not indicated at this time      Code Status: Full code   Disposition: Continue current care       Shruthi Valadez MD, PGY-1  Attending physician: Dr. Olvera Loretto        NOTE: This report was transcribed using voice recognition software. Every effort was made to ensure accuracy; however, inadvertent computerized transcription errors may be present.

## 2023-01-28 ENCOUNTER — APPOINTMENT (OUTPATIENT)
Dept: ULTRASOUND IMAGING | Age: 58
DRG: 064 | End: 2023-01-28
Payer: COMMERCIAL

## 2023-01-28 LAB
ALBUMIN SERPL-MCNC: 3.9 G/DL (ref 3.5–5.2)
ALP BLD-CCNC: 66 U/L (ref 40–129)
ALT SERPL-CCNC: 45 U/L (ref 0–40)
ANION GAP SERPL CALCULATED.3IONS-SCNC: 16 MMOL/L (ref 7–16)
AST SERPL-CCNC: 47 U/L (ref 0–39)
BASOPHILS ABSOLUTE: 0.07 E9/L (ref 0–0.2)
BASOPHILS RELATIVE PERCENT: 1 % (ref 0–2)
BILIRUB SERPL-MCNC: 0.8 MG/DL (ref 0–1.2)
BUN BLDV-MCNC: 12 MG/DL (ref 6–23)
CALCIUM IONIZED: 1.27 MMOL/L (ref 1.15–1.33)
CALCIUM SERPL-MCNC: 9.4 MG/DL (ref 8.6–10.2)
CHLORIDE BLD-SCNC: 103 MMOL/L (ref 98–107)
CO2: 22 MMOL/L (ref 22–29)
CREAT SERPL-MCNC: 1 MG/DL (ref 0.7–1.2)
EOSINOPHILS ABSOLUTE: 0.29 E9/L (ref 0.05–0.5)
EOSINOPHILS RELATIVE PERCENT: 4.1 % (ref 0–6)
GFR SERPL CREATININE-BSD FRML MDRD: >60 ML/MIN/1.73
GLUCOSE BLD-MCNC: 102 MG/DL (ref 74–99)
HCT VFR BLD CALC: 47.8 % (ref 37–54)
HEMOGLOBIN: 16.1 G/DL (ref 12.5–16.5)
IMMATURE GRANULOCYTES #: 0.01 E9/L
IMMATURE GRANULOCYTES %: 0.1 % (ref 0–5)
LYMPHOCYTES ABSOLUTE: 2.24 E9/L (ref 1.5–4)
LYMPHOCYTES RELATIVE PERCENT: 31.9 % (ref 20–42)
MAGNESIUM: 1.9 MG/DL (ref 1.6–2.6)
MCH RBC QN AUTO: 27.8 PG (ref 26–35)
MCHC RBC AUTO-ENTMCNC: 33.7 % (ref 32–34.5)
MCV RBC AUTO: 82.6 FL (ref 80–99.9)
MONOCYTES ABSOLUTE: 0.61 E9/L (ref 0.1–0.95)
MONOCYTES RELATIVE PERCENT: 8.7 % (ref 2–12)
NEUTROPHILS ABSOLUTE: 3.8 E9/L (ref 1.8–7.3)
NEUTROPHILS RELATIVE PERCENT: 54.2 % (ref 43–80)
PDW BLD-RTO: 13.6 FL (ref 11.5–15)
PHOSPHORUS: 3.7 MG/DL (ref 2.5–4.5)
PLATELET # BLD: 219 E9/L (ref 130–450)
PMV BLD AUTO: 12 FL (ref 7–12)
POTASSIUM SERPL-SCNC: 3.4 MMOL/L (ref 3.5–5)
RBC # BLD: 5.79 E12/L (ref 3.8–5.8)
SODIUM BLD-SCNC: 141 MMOL/L (ref 132–146)
TOTAL PROTEIN: 6.3 G/DL (ref 6.4–8.3)
WBC # BLD: 7 E9/L (ref 4.5–11.5)

## 2023-01-28 PROCEDURE — 99223 1ST HOSP IP/OBS HIGH 75: CPT | Performed by: PSYCHIATRY & NEUROLOGY

## 2023-01-28 PROCEDURE — 83735 ASSAY OF MAGNESIUM: CPT

## 2023-01-28 PROCEDURE — 6370000000 HC RX 637 (ALT 250 FOR IP): Performed by: STUDENT IN AN ORGANIZED HEALTH CARE EDUCATION/TRAINING PROGRAM

## 2023-01-28 PROCEDURE — 94640 AIRWAY INHALATION TREATMENT: CPT

## 2023-01-28 PROCEDURE — 93880 EXTRACRANIAL BILAT STUDY: CPT

## 2023-01-28 PROCEDURE — 6360000002 HC RX W HCPCS: Performed by: SURGERY

## 2023-01-28 PROCEDURE — 80053 COMPREHEN METABOLIC PANEL: CPT

## 2023-01-28 PROCEDURE — 84100 ASSAY OF PHOSPHORUS: CPT

## 2023-01-28 PROCEDURE — 85025 COMPLETE CBC W/AUTO DIFF WBC: CPT

## 2023-01-28 PROCEDURE — 99232 SBSQ HOSP IP/OBS MODERATE 35: CPT | Performed by: INTERNAL MEDICINE

## 2023-01-28 PROCEDURE — 6360000002 HC RX W HCPCS: Performed by: STUDENT IN AN ORGANIZED HEALTH CARE EDUCATION/TRAINING PROGRAM

## 2023-01-28 PROCEDURE — 2000000000 HC ICU R&B

## 2023-01-28 PROCEDURE — 6370000000 HC RX 637 (ALT 250 FOR IP): Performed by: CLINICAL NURSE SPECIALIST

## 2023-01-28 PROCEDURE — 6370000000 HC RX 637 (ALT 250 FOR IP): Performed by: SURGERY

## 2023-01-28 PROCEDURE — 6370000000 HC RX 637 (ALT 250 FOR IP): Performed by: PSYCHIATRY & NEUROLOGY

## 2023-01-28 PROCEDURE — 82330 ASSAY OF CALCIUM: CPT

## 2023-01-28 PROCEDURE — 2580000003 HC RX 258: Performed by: STUDENT IN AN ORGANIZED HEALTH CARE EDUCATION/TRAINING PROGRAM

## 2023-01-28 PROCEDURE — 93880 EXTRACRANIAL BILAT STUDY: CPT | Performed by: RADIOLOGY

## 2023-01-28 PROCEDURE — 36415 COLL VENOUS BLD VENIPUNCTURE: CPT

## 2023-01-28 RX ORDER — LANOLIN ALCOHOL/MO/W.PET/CERES
100 CREAM (GRAM) TOPICAL DAILY
Status: DISCONTINUED | OUTPATIENT
Start: 2023-01-28 | End: 2023-01-30 | Stop reason: HOSPADM

## 2023-01-28 RX ORDER — POTASSIUM CHLORIDE 20 MEQ/1
40 TABLET, EXTENDED RELEASE ORAL 2 TIMES DAILY WITH MEALS
Status: DISPENSED | OUTPATIENT
Start: 2023-01-28 | End: 2023-01-30

## 2023-01-28 RX ORDER — POLYETHYLENE GLYCOL 3350 17 G/17G
17 POWDER, FOR SOLUTION ORAL DAILY PRN
Status: DISCONTINUED | OUTPATIENT
Start: 2023-01-28 | End: 2023-01-30 | Stop reason: HOSPADM

## 2023-01-28 RX ORDER — HYDRALAZINE HYDROCHLORIDE 20 MG/ML
10 INJECTION INTRAMUSCULAR; INTRAVENOUS EVERY 6 HOURS PRN
Status: CANCELLED | OUTPATIENT
Start: 2023-01-28

## 2023-01-28 RX ORDER — LABETALOL HYDROCHLORIDE 5 MG/ML
10 INJECTION, SOLUTION INTRAVENOUS EVERY 6 HOURS PRN
Status: CANCELLED | OUTPATIENT
Start: 2023-01-28

## 2023-01-28 RX ORDER — SENNA PLUS 8.6 MG/1
1 TABLET ORAL NIGHTLY
Status: DISCONTINUED | OUTPATIENT
Start: 2023-01-28 | End: 2023-01-30 | Stop reason: HOSPADM

## 2023-01-28 RX ORDER — MAGNESIUM SULFATE IN WATER 40 MG/ML
2000 INJECTION, SOLUTION INTRAVENOUS ONCE
Status: COMPLETED | OUTPATIENT
Start: 2023-01-28 | End: 2023-01-28

## 2023-01-28 RX ORDER — ENOXAPARIN SODIUM 100 MG/ML
40 INJECTION SUBCUTANEOUS DAILY
Status: DISCONTINUED | OUTPATIENT
Start: 2023-01-28 | End: 2023-01-30 | Stop reason: HOSPADM

## 2023-01-28 RX ADMIN — METOPROLOL TARTRATE 25 MG: 25 TABLET, FILM COATED ORAL at 20:09

## 2023-01-28 RX ADMIN — METOPROLOL TARTRATE 25 MG: 25 TABLET, FILM COATED ORAL at 08:04

## 2023-01-28 RX ADMIN — LISINOPRIL 10 MG: 10 TABLET ORAL at 08:04

## 2023-01-28 RX ADMIN — Medication 100 MG: at 19:04

## 2023-01-28 RX ADMIN — POTASSIUM CHLORIDE 40 MEQ: 1500 TABLET, EXTENDED RELEASE ORAL at 08:49

## 2023-01-28 RX ADMIN — ACETAMINOPHEN 650 MG: 325 TABLET ORAL at 08:04

## 2023-01-28 RX ADMIN — Medication 10 ML: at 20:09

## 2023-01-28 RX ADMIN — ENOXAPARIN SODIUM 40 MG: 100 INJECTION SUBCUTANEOUS at 12:56

## 2023-01-28 RX ADMIN — HYDRALAZINE HYDROCHLORIDE 10 MG: 20 INJECTION INTRAMUSCULAR; INTRAVENOUS at 22:05

## 2023-01-28 RX ADMIN — Medication 10 ML: at 08:07

## 2023-01-28 RX ADMIN — LEVETIRACETAM 500 MG: 500 TABLET, FILM COATED ORAL at 20:09

## 2023-01-28 RX ADMIN — IPRATROPIUM BROMIDE 0.5 MG: 0.5 SOLUTION RESPIRATORY (INHALATION) at 20:39

## 2023-01-28 RX ADMIN — Medication 250 MG: at 08:04

## 2023-01-28 RX ADMIN — MAGNESIUM SULFATE HEPTAHYDRATE 2000 MG: 40 INJECTION, SOLUTION INTRAVENOUS at 06:58

## 2023-01-28 RX ADMIN — ACETAMINOPHEN 650 MG: 325 TABLET ORAL at 12:56

## 2023-01-28 RX ADMIN — IPRATROPIUM BROMIDE 0.5 MG: 0.5 SOLUTION RESPIRATORY (INHALATION) at 16:08

## 2023-01-28 RX ADMIN — LEVETIRACETAM 500 MG: 500 TABLET, FILM COATED ORAL at 08:04

## 2023-01-28 ASSESSMENT — PAIN SCALES - GENERAL
PAINLEVEL_OUTOF10: 0

## 2023-01-28 NOTE — PLAN OF CARE
Problem: Chronic Conditions and Co-morbidities  Goal: Patient's chronic conditions and co-morbidity symptoms are monitored and maintained or improved  Outcome: Progressing  Flowsheets (Taken 1/27/2023 0800)  Care Plan - Patient's Chronic Conditions and Co-Morbidity Symptoms are Monitored and Maintained or Improved: Monitor and assess patient's chronic conditions and comorbid symptoms for stability, deterioration, or improvement     Problem: Discharge Planning  Goal: Discharge to home or other facility with appropriate resources  Outcome: Progressing     Problem: ABCDS Injury Assessment  Goal: Absence of physical injury  Outcome: Progressing

## 2023-01-28 NOTE — PROGRESS NOTES
Intensive Care Unit  Critical Care Consult  Daily Progress Note 1/28/2023    Date of Admission: 1/25    EVENTS:   59-year-old male presented to the emergency room secondary to a near syncopal episode while walking outside earlier today. On presentation patient was mildly disoriented and was unable to fully answer questions. Patient underwent a CT of the head that showed intraparenchymal hemorrhage secondary to hypertensive urgency versus possible mass. Started on Cardene, admitted to [facility].  1/26 repeat CT stable. MRI pending. Cardene off.  1/27: MRI showing multiple areas of hemorrhage. PHYSICAL EXAM:    BP (!) 146/100   Pulse 92   Temp 97.3 °F (36.3 °C) (Temporal)   Resp 20   Ht 6' 3\" (1.905 m)   Wt 190 lb (86.2 kg)   SpO2 98%   BMI 23.75 kg/m²     General appearance:  Comfortable. Pain Description: none    GCS:    4 - Opens eyes on own   6 - Follows simple motor commands  5 - Alert and oriented    Pupil size: Left 3 mm  Right 3 mm  Pupil reaction: Yes  Wiggles fingers: Left Yes Right Yes  Hand grasp:   Left normal     Right normal  Wiggles toes: Left Yes    Right Yes  Plantar flexion:  Left normal    Right normal    CONSTITUTIONAL: no acute distress, lying in hospital bed  NEUROLOGIC: PERRL, oriented x 4  CARDIOVASCULAR: S1 S2, regular rate, regular rhythm, no murmur/gallop/rub. Monitor: NSR  PULMONARY: no rhonchi/rales/wheezes, no use of accessory muscles  RENAL: clear yellow urine  ABDOMEN: soft, nontender, nondistended, nontympanic, normal bowel sounds   MUSCULOSKELETAL: moves all extremities purposefully, 5/5 strength   SKIN/EXTREMITIES: no rashes/ecchymosis, no edema/clubbing, warm/dry, good capillary refill     LINES: PIV    CONSULTS:   Medicine  Neurosurgery    History reviewed. No pertinent past medical history. ASSESSMENT/PLAN:       Neuro:  subacute R frontal lobe hemorrhage. Monitor neuro status. Neurosurgery following. Keppra   CV: Hypertension. Monitor hemodynamics.  BP goal less than 140 mmHg. PRN hydralazine & labetalol. Lisinopril. metoprolol   Pulm: No acute issues. Monitor RR & SpO2. Encourage cough and deep breathing. Supplemental oxygen PRN  GI: No acute issues. Diet. Monitor bowel function. Renal:  No acute issues. Monitor BUN & Cr. Monitor electrolytes & replace as needed. Monitor urine output. ID: No acute issues   Endocrine: No acute issues. Hyperglycemia. Monitor BS.  MSK: No acute issues. ROM. Turn & reposition. PT/OT  Heme: No acute issues. Monitor CBC. Bowel regimen: Glycolax, senna. Last BM PTA  Pain control/Sedation:  Tylenol  DVT prophylaxis: SCDs. Lovenox  GI prophylaxis:  diet  Family update: will update when available  Code status: full   Disposition: ICU     The patient is at significant risk for life-threatening respiratory and neurological deterioration and/or death and requires ongoing critical care management.       Total time: 25 minutes    Electronically signed by Eliseo Gomez CNP on 1/28/2023 at 1:13 PM

## 2023-01-28 NOTE — PROGRESS NOTES
[facility]  Internal Medicine Residency Program  Progress Note - House Team     Patient:  Kaycee Hutton 59 y.o. male MRN: [id number]     Date of Service: 1/28/2023     Days since admission: 01/25/0223    Subjective     Overnight events: this morning the patient was seen and examined at bedside in no acute distress. He reports no complaints this morning. No headache, no vision changes, no loss of consciousness, no presyncope or syncope reported. No major events overnight. Objective     Physical Exam:  Vitals: /77   Pulse 68   Temp 98 °F (36.7 °C) (Axillary)   Resp 25   Ht 6' 3\" (1.905 m)   Wt 190 lb (86.2 kg)   SpO2 96%   BMI 23.75 kg/m²     I & O - 24hr:   Intake/Output Summary (Last 24 hours) at 1/28/2023 1915  Last data filed at 1/28/2023 0700  Gross per 24 hour   Intake 730 ml   Output 0 ml   Net 730 ml      General Appearance: alert, appears stated age, and cooperative  HEENT:  Head: Normocephalic, no lesions, without obvious abnormality. Head: Normal, normocephalic, atraumatic. Eye: Normal external eye, conjunctiva, lids cornea, GERALDINE. Lung: clear to auscultation bilaterally  Heart: regular rate and rhythm, S1, S2 normal, no murmur, click, rub or gallop  Abdomen: soft, non-tender; bowel sounds normal; no masses,  no organomegaly  Extremities:  extremities normal, atraumatic, no cyanosis or edema  Musculokeletal: No joint swelling, no muscle tenderness. ROM normal in all joints of extremities.    Neurologic: Mental status: Alert, oriented x3, thought content appropriate, strength 5/5 in both upper and lower extremities, sensation intact to light touch in both extremities and face, CN-II-XII intact, forward flexion and extension intact in both extremities, reflexes 3+ in the both upper and lower extremities     Subjective  Pertinent Information & Imaging Studies, Consults     CBC with Differential:    Lab Results   Component Value Date/Time    WBC 7.7 01/27/2023 06:40 PM RBC 6.13 01/27/2023 06:40 PM    HGB 17.1 01/27/2023 06:40 PM    HCT 49.9 01/27/2023 06:40 PM     01/27/2023 06:40 PM    MCV 81.4 01/27/2023 06:40 PM    MCH 27.9 01/27/2023 06:40 PM    MCHC 34.3 01/27/2023 06:40 PM    RDW 13.5 01/27/2023 06:40 PM    LYMPHOPCT 25.6 01/25/2023 01:35 PM    MONOPCT 4.9 01/25/2023 01:35 PM    BASOPCT 1.5 01/25/2023 01:35 PM    MONOSABS 0.26 01/25/2023 01:35 PM    LYMPHSABS 1.35 01/25/2023 01:35 PM    EOSABS 0.16 01/25/2023 01:35 PM    BASOSABS 0.08 01/25/2023 01:35 PM     CMP:    Lab Results   Component Value Date/Time     01/27/2023 06:40 PM    K 3.5 01/27/2023 06:40 PM     01/27/2023 06:40 PM    CO2 24 01/27/2023 06:40 PM    BUN 11 01/27/2023 06:40 PM    CREATININE 1.0 01/27/2023 06:40 PM    GFRAA >60 02/25/2019 10:40 AM    LABGLOM >60 01/27/2023 06:40 PM    GLUCOSE 182 01/27/2023 06:40 PM    PROT 6.6 01/27/2023 06:40 PM    LABALBU 4.1 01/27/2023 06:40 PM    CALCIUM 9.7 01/27/2023 06:40 PM    BILITOT 0.7 01/27/2023 06:40 PM    ALKPHOS 71 01/27/2023 06:40 PM    AST 48 01/27/2023 06:40 PM    ALT 47 01/27/2023 06:40 PM       IMAGING:   Imaging Studies:    CT HEAD WO CONTRAST    Result Date: 1/25/2023  1. There is hemorrhage within the right frontal lobe which could represent intraparenchymal hemorrhage or hemorrhagic mass. There is surrounding edema with mass effect and no midline shift. 2. Possible non hemorrhagic mass in the left frontal lobe. 3. Chronic small vessel ischemic disease. Findings discussed with Dr. Lor Escalante via telephone on 01/25/2023 at 1501 hours [facility] Standard time. RECOMMENDATIONS: Recommend MRI brain without and with intravenous contrast.     CT HEAD W CONTRAST    Result Date: 1/26/2023  1. Similar-appearing peripherally enhancing right frontal intraparenchymal hemorrhage with associated mild mass effect. No suggestion of active extravasation. 2. Similar-appearing peripherally enhancing left frontal hypodense area.   No significant associated mass effect. RECOMMENDATIONS: If clinical concerns persist, can consider further evaluation with contrast-enhanced MRI if no contraindications. XR CHEST PORTABLE    Result Date: 1/25/2023  No acute process. MRI BRAIN W WO CONTRAST    Result Date: 1/27/2023  1. Acute hemorrhage present in the right frontal lobe with mild surrounding edema. 2. Late subacute hemorrhage present in the left frontal lobe and to a lesser degree in the high right frontal lobe. No significant associated edema. 3. Involutional parenchymal changes with severe microvascular ischemic disease and scattered chronic lacunar infarcts. 4. Scattered foci of susceptibility likely represent microhemorrhages, most commonly seen in setting of hypertensive microangiopathy. 5. No evidence of acute infarct, enhancing mass lesion, midline shift, or ventriculomegaly. Notable Cultures:      Blood cultures   Blood Culture, Routine   Date Value Ref Range Status   01/25/2023 24 Hours no growth  Preliminary     Respiratory cultures No results found for: RESPCULTURE No results found for: LABGRAM  Urine No results found for: LABURIN  Legionella No results found for: LABLEGI  C Diff PCR No results found for: CDIFPCR  Wound culture/abscess: No results for input(s): WNDABS in the last 72 hours. Tip culture:No results for input(s): CXCATHTIP in the last 72 hours.     Resident's Assessment and Plan   Consults:   Neurosurgery   SICU     Assessment:  Acute intraparenchymal hemorrhage - right frontal lobe   Superimposed on late subacute hemorrhage in the left frontal lobe   Low threshold for deterioration  Elevate head of the bed, neurovascular checks  Currently stable neurologically speaking   BP continues to remain stable   Avoid anticoagulation/antiplatelets  Continue to monitor BP   Off of Cardene drip   Continue with Keppra   Follow neurosurgery recs     Elevated anion gap   Elevated AG - corrected normal      HTN - BP stable  Goal 140/90 mmHg   On lisinopril and metoprolol   PRNs - hydralazine and labetalol   Continue to monitor blood pressure in the setting of intraparenchymal hemorrhage    Hx of COPD -stable  No PFT on file  Albuterol and aclidinium inhalers on med list  Currently not taking     Hx of HFpEF  EF 60-65% (TTE 2019)  Currently euvolemic -stable     History of tobacco abuse  Reports that he smokes 1 pack a day  Not willing to quit       PT/OT: Not indicated at this time  DVT ppx: PCD   GI ppx: Not indicated at this time      Code Status: Full code   Disposition: Continue current care     Chirag Acosta MD, PGY-1  Attending physician: Dr. Maryann Nicole  NOTE: This report was transcribed using voice recognition software. Every effort was made to ensure accuracy; however, inadvertent computerized transcription errors may be present. [facility]  Internal Medicine Residency Program  Attending Physician Statement:  Katie Concepcion M.D., F.A.C.P. I have discussed the case, including pertinent history and exam findings with the resident. I have seen and examined the patient--agree with the resident assessment of ROS, PMHx, PSHx, meds reviewed and assessment--Remainder of medical problems as per resident note. Hospital charts reviewed, including other providers notes, relevant labs and imaging. >50% of time spent coordinating care with residents, other providers and/or counseling patient   re: workup/plan and orders as documented by the resident   For billing--Using Medical decision making level of complexity and time spent on case    Multiple different age-- bleeds intracerebral b/l  No hx of trauma or prior CVA;, bleeds  Wasn't taking any home needs  +drug screen negative    Bp not very high in ER  Off cardine drip-- BP <140 goal    Multiple ?embolic CVA different ages  +/- blood vessel weakness ? HTN +/- amyloid angioapthy. Irn 1.3  No AVN, no aneursyms seen  Carotids needs evaluated  KENY?  Rule out embolic CVA etc..    Inc liver/?hepatic [id number]  Also on keppra- possible cause, wean off keppra (NS if they want)- no seizures  Controversial role here. BS borderline elevated, check aic- -preDM vs dm2  Check lipids--< ldl 70  Statin use controversial in brain bleeds-- would recommend to discontinue based on literature review today.

## 2023-01-28 NOTE — CONSULTS
[facility]  Neurology Consult    Date:  1/28/2023  Patient Name:  Simón Colunga  YOB: 1965  MRN: [id number]     PCP:  No primary care provider on file. Referring:  No ref. provider found      Chief Complaint: intracranial hemorrhage    History obtained from: chart    Assessment  Simón Colunga is a 59 y.o. male admitted with bilateral front lobe parenchymal hemorrhages. He has evidence of multiple microhemorrhages in both deep brain structures as well as lobar areas consistent with cerebral hypertensive microangiopathy. This correlates with his recorded BP up to 185/102 on presentation. Cerebral amyloid angiopathy is less favored given the patient is younger than would be expected for this entity, and also, these microhemorrhages tend to be more prominent in the lobar regions primarily. Several of the old strokes mentioned appear lacunar, though, once the subcortical white matter disease becomes confluent this is harder to state definitively in some areas. Regardless, there do not appear to be any definitive large cortically based strokes on MRI suspicious for an embolic etiology. Plan  Recommend ASA 81 mg once cleared for antiplatelets by neurosurgery, would expect within ~4 weeks potentially  Statin therapy, goal LDL<70  Thiamine 100 mg HS  Keppra 500 BID x 7 days from admission is reasonable, though, there is conflicting data on utility of seizure prophylaxis for IPH  Maintain good BP control  Neurology service will sign off at this time. Please contact our service with any further questions or concerns. History of Present Illness:  Simón Colunga is a 59 y.o. male presenting for evaluation of intracranial hemorrhage with associated cerebral edema. The patient himself does not provide any history. He was reportedly found wandering outside of a restaurant after nearly passing out.      He was taken to the ED where CT head was obtained which showed an intracranial hemorrhage. BP up to 185/102 in ED. He has a history of smoking, HTN, and alcohol use. MRI brain was done on 1/27/23 which shows right frontal (including high cerebral convexity) and left frontal lobes. There are also numerous microhemorrhages noted in both deep white matter as well as lobar areas consistent with hypertensive angiopathy.        Review of Systems:  Refuses to cooperate with exam    Medical History:   Does not answer questions     Surgical History:   Past Surgical History:   Procedure Laterality Date    COLONOSCOPY          Family History:   No questions answer    Social History:  Social History     Tobacco Use    Smoking status: Every Day     Packs/day: 1.00     Types: Cigarettes    Smokeless tobacco: Never   Substance Use Topics    Alcohol use: Yes     Comment: rare    Drug use: Yes     Types: Marijuana Jurline Vinay)        Current Medications:      Current Facility-Administered Medications   Medication Dose Route Frequency Provider Last Rate Last Admin    polyethylene glycol (GLYCOLAX) packet 17 g  17 g Oral Daily PRN Thresea Gun, APRN - NP        senna (SENOKOT) tablet 8.6 mg  1 tablet Oral Nightly Thresea Gun, APRN - NP        potassium chloride (KLOR-CON M) extended release tablet 40 mEq  40 mEq Oral BID WC Thresea Gun, APRN - NP   40 mEq at 01/28/23 0849    perflutren lipid microspheres (DEFINITY) injection 1.5 mL  1.5 mL IntraVENous ONCE PRN Gisselle Villareal MD        enoxaparin (LOVENOX) injection 40 mg  40 mg SubCUTAneous Daily Gisselle Villareal MD   40 mg at 01/28/23 1256    ipratropium (ATROVENT) 0.02 % nebulizer solution 0.5 mg  0.5 mg Nebulization 4x daily Rishi Orellana MD   0.5 mg at 01/28/23 1608    albuterol (PROVENTIL) nebulizer solution 2.5 mg  2.5 mg Nebulization Q4H PRN Penny Mckinley MD        levETIRAcetam (KEPPRA) tablet 500 mg  500 mg Oral BID Gisselle Villareal MD   500 mg at 01/28/23 0804    labetalol (NORMODYNE;TRANDATE) injection 10 mg  10 mg IntraVENous Q30 Min PRN Gianmarino C Gianfrate, DO        hydrALAZINE (APRESOLINE) injection 10 mg  10 mg IntraVENous Q30 Min PRN Gianmarino C Gianfrate, DO   10 mg at 01/27/23 1450    sodium chloride flush 0.9 % injection 10 mL  10 mL IntraVENous 2 times per day Gianmarino C Gianfrate, DO   10 mL at 01/28/23 0807    sodium chloride flush 0.9 % injection 10 mL  10 mL IntraVENous PRN Gianmarino C Gianfrate, DO   10 mL at 01/26/23 1207    0.9 % sodium chloride infusion   IntraVENous PRN Gianmarino C Gianfrate, DO        ondansetron (ZOFRAN-ODT) disintegrating tablet 4 mg  4 mg Oral Q8H PRN Gianmarino C Gianfrate, DO        Or    ondansetron (ZOFRAN) injection 4 mg  4 mg IntraVENous Q6H PRN Gianmarino C Gianfrate, DO        acetaminophen (TYLENOL) tablet 650 mg  650 mg Oral Q6H Gianmarino C Gianfrate, DO   650 mg at 01/28/23 1256    metoprolol tartrate (LOPRESSOR) tablet 25 mg  25 mg Oral BID Gianmarino C Gianfrate, DO   25 mg at 01/28/23 0804    lisinopril (PRINIVIL;ZESTRIL) tablet 10 mg  10 mg Oral Daily Gianmarino C Gianfrate, DO   10 mg at 01/28/23 0804        Allergies:      No Known Allergies     Physical Examination  Vitals   Vitals:    01/28/23 1200 01/28/23 1600 01/28/23 1608 01/28/23 1700   BP:    (!) 144/93   Pulse:   59    Resp:   16    Temp:  97.5 °F (36.4 °C)     TempSrc:  Oral     SpO2: 98% 95%     Weight:       Height:            General: Patient appears in no acute distress. HEENT: Normocephalic, atraumatic  Chest: no dyspnnea  Heart: RRR  Extremities/Peripheral vascular: No edema/swelling noted. Neurologic Examination    Mental Status  Will awaken to loud voice, but will not answer questions or follow instructions. Tells examiner to \"bug off\" and \"I'm Eileen Relic you\" when persisting with queries in a loud voice. Cranial Nerves  II. Visual fields: will not open eyes for assessment Fundoscopic exam: Discs not well visualized.    III, IV, VI:   V.   VII: Facial movements symmetric VIII: Hearing intact to voice  IX,X: Palate elevates symmetrically. No significant dysarthria  XI: shoulders appear symmetric  XII:     Motor  Moves all four limbs away from noxious stimulation symmetrically, but largely not cooperative with motor testing      Sensation  Withdraws bilateral legs to light noxious stimulation    Reflexes     Right Left   Biceps 1 1   Brachioradialis 1 1   Patellar 0 0   Achilles 0 0   ankle clonus none none   Babinski absent absent     Coordination  No resting tremors noted    Gait  Deferred for safety/fall consideration      Labs  Recent Labs     01/26/23  0520 01/27/23  0610 01/28/23  0445      < > 141   K 4.2   < > 3.4*      < > 103   CO2 23   < > 22   BUN 12   < > 12   CREATININE 0.8   < > 1.0   GLUCOSE 85   < > 102*   CALCIUM 9.2   < > 9.4   PROT 6.1*   < > 6.3*   LABALBU 4.0   < > 3.9   BILITOT 0.9   < > 0.8   ALKPHOS 59   < > 66   AST 27   < > 47*   ALT 23   < > 45*   WBC 6.8   < > 7.0   RBC 5.55   < > 5.79   HGB 15.7   < > 16.1   HCT 47.4   < > 47.8   MCV 85.4   < > 82.6   MCH 28.3   < > 27.8   MCHC 33.1   < > 33.7   RDW 13.0   < > 13.6      < > 219   MPV 11.6   < > 12.0   HDL 49  --   --    LDLCALC 57  --   --     < > = values in this interval not displayed. Imaging  US CAROTID ARTERY BILATERAL   Final Result   Atherosclerotic disease. No hemodynamically significant stenosis is   identified   Estimated stenosis by NASCET criteria in the proximal right carotid   artery is between 0% and 49%. Estimated stenosis by NASCET criteria in the proximal left carotid   artery is between 0% and 49%. MRI BRAIN W WO CONTRAST   Final Result   1. Acute hemorrhage present in the right frontal lobe with mild surrounding   edema. 2. Late subacute hemorrhage present in the left frontal lobe and to a lesser   degree in the high right frontal lobe. No significant associated edema.    3. Involutional parenchymal changes with severe microvascular ischemic disease and scattered chronic lacunar infarcts. 4. Scattered foci of susceptibility likely represent microhemorrhages, most   commonly seen in setting of hypertensive microangiopathy. 5. No evidence of acute infarct, enhancing mass lesion, midline shift, or   ventriculomegaly. CT HEAD W CONTRAST   Final Result   1. Similar-appearing peripherally enhancing right frontal intraparenchymal   hemorrhage with associated mild mass effect. No suggestion of active   extravasation. 2. Similar-appearing peripherally enhancing left frontal hypodense area. No   significant associated mass effect. RECOMMENDATIONS:   If clinical concerns persist, can consider further evaluation with   contrast-enhanced MRI if no contraindications. CT HEAD WO CONTRAST   Final Result   1. There is hemorrhage within the right frontal lobe which could represent   intraparenchymal hemorrhage or hemorrhagic mass. There is surrounding edema   with mass effect and no midline shift. 2. Possible non hemorrhagic mass in the left frontal lobe. 3. Chronic small vessel ischemic disease. Findings discussed with Dr. Juhi Lugo via telephone on 01/25/2023 at 1501 hours   [facility] Standard time. RECOMMENDATIONS:   Recommend MRI brain without and with intravenous contrast.         XR CHEST PORTABLE   Final Result   No acute process. I have personally reviewed the following images: MRI brain shows multiple microhemorrhages, both in deep structures as well as lobar consistent with a suspected clinical history of long standing poorly controlled hypertension.         Electronically signed by Reji Gustafson DO on 1/28/2023 at 5:38 PM

## 2023-01-28 NOTE — PROGRESS NOTES
Neurosurg progress note  VITALS:  BP (!) 146/100   Pulse 92   Temp 97.3 °F (36.3 °C) (Temporal)   Resp 20   Ht 6' 3\" (1.905 m)   Wt 190 lb (86.2 kg)   SpO2 96%   BMI 23.75 kg/m²   24HR INTAKE/OUTPUT:    Intake/Output Summary (Last 24 hours) at 1/28/2023 1220  Last data filed at 1/28/2023 1000  Gross per 24 hour   Intake 720 ml   Output 0 ml   Net 720 ml     CT HEAD WO CONTRAST    Result Date: 1/25/2023  EXAMINATION: CT OF THE HEAD WITHOUT CONTRAST  1/25/2023 2:12 pm TECHNIQUE: CT of the head was performed without the administration of intravenous contrast. Automated exposure control, iterative reconstruction, and/or weight based adjustment of the mA/kV was utilized to reduce the radiation dose to as low as reasonably achievable. COMPARISON: None. HISTORY: ORDERING SYSTEM PROVIDED HISTORY: ams TECHNOLOGIST PROVIDED HISTORY: Has a \"code stroke\" or \"stroke alert\" been called? ->No Reason for exam:->ams Decision Support Exception - unselect if not a suspected or confirmed emergency medical condition->Emergency Medical Condition (MA) FINDINGS: There are hypodensities within the raad. There is patchy and confluent hypoattenuation within the white matter of the bilateral cerebral hemispheres. There is hyperdense hemorrhage in the right frontal lobe which measures approximately 2.9 cm in size. There is surrounding edema with mass effect and effacement of the adjacent sulci. There is mild mass effect on the anterior horn of the right lateral ventricle. There is no midline shift. There is a hypodensity within the left thalamus. There is hypodensity within the anterior corpus callosum. There is possible hypodense intra-axial mass in the left frontal lobe which measures approximately 2.9 cm in size. There is no additional evidence of mass. There are no extra-axial fluid collections. The calvarium is intact. There is mild mucosal thickening within the right frontal sinus.   The remaining visualized paranasal sinuses and mastoid air cells are clear. There is a small sclerotic focus within the right parietal calvarium. 1. There is hemorrhage within the right frontal lobe which could represent intraparenchymal hemorrhage or hemorrhagic mass. There is surrounding edema with mass effect and no midline shift. 2. Possible non hemorrhagic mass in the left frontal lobe. 3. Chronic small vessel ischemic disease. Findings discussed with Dr. John Morris via telephone on 01/25/2023 at 1501 hours [place] Standard time. RECOMMENDATIONS: Recommend MRI brain without and with intravenous contrast.     CT HEAD W CONTRAST    Result Date: 1/26/2023  EXAMINATION: CT OF THE HEAD WITH CONTRAST  1/26/2023 9:21 am TECHNIQUE: CT of the head/brain was performed with the administration of intravenous contrast. Multiplanar reformatted images are provided for review. Automated exposure control, iterative reconstruction, and/or weight based adjustment of the mA/kV was utilized to reduce the radiation dose to as low as reasonably achievable. COMPARISON: 01/25/2022 CT brain. HISTORY: ORDERING SYSTEM PROVIDED HISTORY: Possible mass TECHNOLOGIST PROVIDED HISTORY: Reason for exam:->Possible mass What reading provider will be dictating this exam?->CRC FINDINGS: BRAIN/VENTRICLES: Similar-appearing peripherally enhancing right frontal intraparenchymal hemorrhage with associated mild mass effect. No suggestion of active extravasation. Similar-appearing peripherally enhancing left frontal hypodense area without significant associated mass effect. No significant midline shift. No ventriculomegaly. No large territorial hypodensity. Involutional parenchymal changes with suggestion of severe microvascular ischemic disease. ORBITS: The visualized portion of the orbits demonstrate no acute abnormality. SINUSES: The visualized paranasal sinuses and mastoid air cells demonstrate no acute abnormality.  SOFT TISSUES/SKULL:  No acute abnormality of the visualized skull or soft tissues. 1. Similar-appearing peripherally enhancing right frontal intraparenchymal hemorrhage with associated mild mass effect. No suggestion of active extravasation. 2. Similar-appearing peripherally enhancing left frontal hypodense area. No significant associated mass effect. RECOMMENDATIONS: If clinical concerns persist, can consider further evaluation with contrast-enhanced MRI if no contraindications. XR CHEST PORTABLE    Result Date: 1/25/2023  EXAMINATION: ONE XRAY VIEW OF THE CHEST 1/25/2023 1:25 pm COMPARISON: None. HISTORY: ORDERING SYSTEM PROVIDED HISTORY: ams TECHNOLOGIST PROVIDED HISTORY: Reason for exam:->ams FINDINGS: The lungs are without acute focal process. There is no effusion or pneumothorax. The cardiomediastinal silhouette is without acute process. The osseous structures are without acute process. No acute process.      CBC:   Lab Results   Component Value Date/Time    WBC 7.0 01/28/2023 04:45 AM    RBC 5.79 01/28/2023 04:45 AM    HGB 16.1 01/28/2023 04:45 AM    HCT 47.8 01/28/2023 04:45 AM    MCV 82.6 01/28/2023 04:45 AM    MCH 27.8 01/28/2023 04:45 AM    MCHC 33.7 01/28/2023 04:45 AM    RDW 13.6 01/28/2023 04:45 AM     01/28/2023 04:45 AM    MPV 12.0 01/28/2023 04:45 AM     BMP:    Lab Results   Component Value Date/Time     01/28/2023 04:45 AM    K 3.4 01/28/2023 04:45 AM     01/28/2023 04:45 AM    CO2 22 01/28/2023 04:45 AM    BUN 12 01/28/2023 04:45 AM    LABALBU 3.9 01/28/2023 04:45 AM    CREATININE 1.0 01/28/2023 04:45 AM    CALCIUM 9.4 01/28/2023 04:45 AM    GFRAA >60 02/25/2019 10:40 AM    LABGLOM >60 01/28/2023 04:45 AM    GLUCOSE 102 01/28/2023 04:45 AM      senna  1 tablet Oral Nightly    potassium chloride  40 mEq Oral BID     enoxaparin  40 mg SubCUTAneous Daily    ipratropium  0.5 mg Nebulization 4x daily    levETIRAcetam  500 mg Oral BID    sodium chloride flush  10 mL IntraVENous 2 times per day    acetaminophen  650 mg Oral Q6H metoprolol tartrate  25 mg Oral BID    lisinopril  10 mg Oral Daily     Patient remains awake pleasantly confused nonfocal neuro exam pupils equal round reactive to light follows simple commands      Assessment:  Patient Active Problem List   Diagnosis    Congestive heart failure of unknown etiology ([facility] [address]    Sinus tachycardia by electrocardiogram    Abnormal QT interval present on electrocardiogram    Acute diastolic (congestive) heart failure (HCC)    Precordial pain    Essential hypertension    Tobacco abuse    Intracranial bleed (HCC)    Intracranial hemorrhage (HCC)    Hypertensive emergency    Alcohol dependence ([facility] [address]     Plan: MRI results carefully reviewed with the patient, no neurosurgical intervention needed or planned, patient may be transferred to the floor to telemetry control blood pressure full dose anticoagulation contraindicated okay for neurology consultation probably should undergo imaging of the vessels to the neck as well as cardiac echo will await neurology's recommendations certainly needs to control his risk factors for future ischemic stroke   Omar Patton MD M.D.

## 2023-01-28 NOTE — FLOWSHEET NOTE
Asked pt if he had family that he would like contacted regarding his admission. Pt states \"No, Thank you. \"

## 2023-01-28 NOTE — PLAN OF CARE
Problem: Safety - Adult  Goal: Free from fall injury  1/28/2023 0728 by Ashleigh Butler RN  Outcome: Progressing  Flowsheets (Taken 1/28/2023 0656)  Free From Fall Injury:   Sosa Stark family/caregiver on patient safety   Based on caregiver fall risk screen, instruct family/caregiver to ask for assistance with transferring infant if caregiver noted to have fall risk factors  Note: Fall precautions in place (bed alarm, socks, close to nurse's station)  Assist pt with all activities  1/27/2023 1911 by Alberto Delgadillo RN  Outcome: Progressing     Problem: Neurosensory - Adult  Goal: Achieves stable or improved neurological status  1/28/2023 0728 by Ashleigh Butler RN  Outcome: Progressing  Flowsheets (Taken 1/28/2023 6014)  Achieves stable or improved neurological status:   Assess for and report changes in neurological status   Initiate measures to prevent increased intracranial pressure   Maintain blood pressure and fluid volume within ordered parameters to optimize cerebral perfusion and minimize risk of hemorrhage   Monitor temperature, glucose, and sodium.  Initiate appropriate interventions as ordered  Note: Assess neuro status Qshift  Monitor BP, electrolytes, and temperature  1/27/2023 1911 by Alberto Delgadillo RN  Outcome: Progressing     Problem: Skin/Tissue Integrity - Adult  Goal: Skin integrity remains intact  Outcome: Progressing  Flowsheets (Taken 1/28/2023 0739)  Skin Integrity Remains Intact:   Monitor for areas of redness and/or skin breakdown   Assess vascular access sites hourly   Every 4-6 hours minimum: Change oxygen saturation probe site   Every 4-6 hours: If on nasal continuous positive airway pressure, respiratory therapy assesses nares and determine need for appliance change or resting period  Note: Assess skin Qshift  Encourage pt to self turn  Assist pt OOB to chair for meals     Problem: Musculoskeletal - Adult  Goal: Return mobility to safest level of function  Outcome: Progressing  Flowsheets (Taken 1/28/2023 7580)  Return Mobility to Safest Level of Function:   Assist with transfers and ambulation using safe patient handling equipment as needed   Assess patient stability and activity tolerance for standing, transferring and ambulating with or without assistive devices   Ensure adequate protection for wounds/incisions during mobilization   Obtain physical therapy/occupational therapy consults as needed   Apply continuous passive motion per provider or physical therapy orders to increase flexion toward goal   Instruct patient/family in ordered activity level  Note: Assist pt with activities and transfers  Consult PT/OT  Encourage early mobilization as tolerated

## 2023-01-29 LAB
HBA1C MFR BLD: 5.7 % (ref 4–5.6)
METER GLUCOSE: 94 MG/DL (ref 74–99)

## 2023-01-29 PROCEDURE — 99231 SBSQ HOSP IP/OBS SF/LOW 25: CPT | Performed by: INTERNAL MEDICINE

## 2023-01-29 PROCEDURE — 6370000000 HC RX 637 (ALT 250 FOR IP): Performed by: PSYCHIATRY & NEUROLOGY

## 2023-01-29 PROCEDURE — 6370000000 HC RX 637 (ALT 250 FOR IP): Performed by: CLINICAL NURSE SPECIALIST

## 2023-01-29 PROCEDURE — 6370000000 HC RX 637 (ALT 250 FOR IP): Performed by: STUDENT IN AN ORGANIZED HEALTH CARE EDUCATION/TRAINING PROGRAM

## 2023-01-29 PROCEDURE — 97165 OT EVAL LOW COMPLEX 30 MIN: CPT

## 2023-01-29 PROCEDURE — 94640 AIRWAY INHALATION TREATMENT: CPT

## 2023-01-29 PROCEDURE — 6370000000 HC RX 637 (ALT 250 FOR IP): Performed by: SURGERY

## 2023-01-29 PROCEDURE — 2580000003 HC RX 258: Performed by: STUDENT IN AN ORGANIZED HEALTH CARE EDUCATION/TRAINING PROGRAM

## 2023-01-29 PROCEDURE — 83036 HEMOGLOBIN GLYCOSYLATED A1C: CPT

## 2023-01-29 PROCEDURE — 36415 COLL VENOUS BLD VENIPUNCTURE: CPT

## 2023-01-29 PROCEDURE — 97535 SELF CARE MNGMENT TRAINING: CPT

## 2023-01-29 PROCEDURE — 97161 PT EVAL LOW COMPLEX 20 MIN: CPT

## 2023-01-29 PROCEDURE — 97530 THERAPEUTIC ACTIVITIES: CPT

## 2023-01-29 PROCEDURE — 2060000000 HC ICU INTERMEDIATE R&B

## 2023-01-29 PROCEDURE — 82962 GLUCOSE BLOOD TEST: CPT

## 2023-01-29 PROCEDURE — 6360000002 HC RX W HCPCS: Performed by: SURGERY

## 2023-01-29 RX ORDER — DEXTROSE MONOHYDRATE 100 MG/ML
INJECTION, SOLUTION INTRAVENOUS CONTINUOUS PRN
Status: DISCONTINUED | OUTPATIENT
Start: 2023-01-29 | End: 2023-01-30 | Stop reason: HOSPADM

## 2023-01-29 RX ORDER — LABETALOL HYDROCHLORIDE 5 MG/ML
10 INJECTION, SOLUTION INTRAVENOUS EVERY 6 HOURS PRN
Status: DISCONTINUED | OUTPATIENT
Start: 2023-01-29 | End: 2023-01-30 | Stop reason: HOSPADM

## 2023-01-29 RX ORDER — HYDRALAZINE HYDROCHLORIDE 20 MG/ML
10 INJECTION INTRAMUSCULAR; INTRAVENOUS EVERY 6 HOURS PRN
Status: DISCONTINUED | OUTPATIENT
Start: 2023-01-29 | End: 2023-01-30 | Stop reason: HOSPADM

## 2023-01-29 RX ADMIN — METOPROLOL TARTRATE 25 MG: 25 TABLET, FILM COATED ORAL at 21:19

## 2023-01-29 RX ADMIN — Medication 10 ML: at 21:19

## 2023-01-29 RX ADMIN — ACETAMINOPHEN 650 MG: 325 TABLET ORAL at 17:57

## 2023-01-29 RX ADMIN — POTASSIUM CHLORIDE 40 MEQ: 1500 TABLET, EXTENDED RELEASE ORAL at 08:48

## 2023-01-29 RX ADMIN — POTASSIUM CHLORIDE 40 MEQ: 1500 TABLET, EXTENDED RELEASE ORAL at 17:57

## 2023-01-29 RX ADMIN — LEVETIRACETAM 500 MG: 500 TABLET, FILM COATED ORAL at 08:49

## 2023-01-29 RX ADMIN — IPRATROPIUM BROMIDE 0.5 MG: 0.5 SOLUTION RESPIRATORY (INHALATION) at 12:51

## 2023-01-29 RX ADMIN — ACETAMINOPHEN 650 MG: 325 TABLET ORAL at 08:48

## 2023-01-29 RX ADMIN — Medication 100 MG: at 08:54

## 2023-01-29 RX ADMIN — SENNOSIDES 8.6 MG: 8.6 TABLET, FILM COATED ORAL at 21:19

## 2023-01-29 RX ADMIN — IPRATROPIUM BROMIDE 0.5 MG: 0.5 SOLUTION RESPIRATORY (INHALATION) at 17:09

## 2023-01-29 RX ADMIN — ACETAMINOPHEN 650 MG: 325 TABLET ORAL at 14:36

## 2023-01-29 RX ADMIN — LEVETIRACETAM 500 MG: 500 TABLET, FILM COATED ORAL at 21:19

## 2023-01-29 RX ADMIN — LISINOPRIL 10 MG: 10 TABLET ORAL at 08:48

## 2023-01-29 RX ADMIN — METOPROLOL TARTRATE 25 MG: 25 TABLET, FILM COATED ORAL at 08:50

## 2023-01-29 RX ADMIN — IPRATROPIUM BROMIDE 0.5 MG: 0.5 SOLUTION RESPIRATORY (INHALATION) at 09:11

## 2023-01-29 RX ADMIN — Medication 10 ML: at 08:54

## 2023-01-29 ASSESSMENT — PAIN SCALES - GENERAL
PAINLEVEL_OUTOF10: 0
PAINLEVEL_OUTOF10: 4

## 2023-01-29 NOTE — PROGRESS NOTES
[address]   Internal Medicine Residency / [address]    Attending Physician Statement  I have discussed the case, including pertinent history and exam findings with the resident and the team.  I have seen and examined the patient and the key elements of the encounter have been performed by me. I agree with the assessment, plan and orders as documented by the resident. Alert and dense aphasia, expressive and probably some receptive  Appears to be mobile and continent  VS stable , BP controlled  A1C 5.7  Meds reviewed and on Keppra  Lobar hemorrhage ,HTN and or possible amyloid angiopathy  PLAN; [facility] of medical problems as per resident note.       Laura Zamudio MD [facility]  Internal Medicine Residency Faculty

## 2023-01-29 NOTE — PROGRESS NOTES
Neurosurg progress note  VITALS:  BP (!) 141/100   Pulse 73   Temp 97.3 °F (36.3 °C) (Temporal)   Resp 20   Ht 6' 3\" (1.905 m)   Wt 190 lb (86.2 kg)   SpO2 98%   BMI 23.75 kg/m²   24HR INTAKE/OUTPUT:    Intake/Output Summary (Last 24 hours) at 1/29/2023 1232  Last data filed at 1/28/2023 2000  Gross per 24 hour   Intake 550 ml   Output --   Net 550 ml     CT HEAD WO CONTRAST    Result Date: 1/25/2023  EXAMINATION: CT OF THE HEAD WITHOUT CONTRAST  1/25/2023 2:12 pm TECHNIQUE: CT of the head was performed without the administration of intravenous contrast. Automated exposure control, iterative reconstruction, and/or weight based adjustment of the mA/kV was utilized to reduce the radiation dose to as low as reasonably achievable. COMPARISON: None. HISTORY: ORDERING SYSTEM PROVIDED HISTORY: ams TECHNOLOGIST PROVIDED HISTORY: Has a \"code stroke\" or \"stroke alert\" been called? ->No Reason for exam:->ams Decision Support Exception - unselect if not a suspected or confirmed emergency medical condition->Emergency Medical Condition (MA) FINDINGS: There are hypodensities within the raad. There is patchy and confluent hypoattenuation within the white matter of the bilateral cerebral hemispheres. There is hyperdense hemorrhage in the right frontal lobe which measures approximately 2.9 cm in size. There is surrounding edema with mass effect and effacement of the adjacent sulci. There is mild mass effect on the anterior horn of the right lateral ventricle. There is no midline shift. There is a hypodensity within the left thalamus. There is hypodensity within the anterior corpus callosum. There is possible hypodense intra-axial mass in the left frontal lobe which measures approximately 2.9 cm in size. There is no additional evidence of mass. There are no extra-axial fluid collections. The calvarium is intact. There is mild mucosal thickening within the right frontal sinus.   The remaining visualized paranasal sinuses and mastoid air cells are clear. There is a small sclerotic focus within the right parietal calvarium. 1. There is hemorrhage within the right frontal lobe which could represent intraparenchymal hemorrhage or hemorrhagic mass. There is surrounding edema with mass effect and no midline shift. 2. Possible non hemorrhagic mass in the left frontal lobe. 3. Chronic small vessel ischemic disease. Findings discussed with Dr. Steve Puente via telephone on 01/25/2023 at 1501 hours [place] Standard time. RECOMMENDATIONS: Recommend MRI brain without and with intravenous contrast.     CT HEAD W CONTRAST    Result Date: 1/26/2023  EXAMINATION: CT OF THE HEAD WITH CONTRAST  1/26/2023 9:21 am TECHNIQUE: CT of the head/brain was performed with the administration of intravenous contrast. Multiplanar reformatted images are provided for review. Automated exposure control, iterative reconstruction, and/or weight based adjustment of the mA/kV was utilized to reduce the radiation dose to as low as reasonably achievable. COMPARISON: 01/25/2022 CT brain. HISTORY: ORDERING SYSTEM PROVIDED HISTORY: Possible mass TECHNOLOGIST PROVIDED HISTORY: Reason for exam:->Possible mass What reading provider will be dictating this exam?->CRC FINDINGS: BRAIN/VENTRICLES: Similar-appearing peripherally enhancing right frontal intraparenchymal hemorrhage with associated mild mass effect. No suggestion of active extravasation. Similar-appearing peripherally enhancing left frontal hypodense area without significant associated mass effect. No significant midline shift. No ventriculomegaly. No large territorial hypodensity. Involutional parenchymal changes with suggestion of severe microvascular ischemic disease. ORBITS: The visualized portion of the orbits demonstrate no acute abnormality. SINUSES: The visualized paranasal sinuses and mastoid air cells demonstrate no acute abnormality.  SOFT TISSUES/SKULL:  No acute abnormality of the visualized skull or soft tissues. 1. Similar-appearing peripherally enhancing right frontal intraparenchymal hemorrhage with associated mild mass effect. No suggestion of active extravasation. 2. Similar-appearing peripherally enhancing left frontal hypodense area. No significant associated mass effect. RECOMMENDATIONS: If clinical concerns persist, can consider further evaluation with contrast-enhanced MRI if no contraindications. XR CHEST PORTABLE    Result Date: 1/25/2023  EXAMINATION: ONE XRAY VIEW OF THE CHEST 1/25/2023 1:25 pm COMPARISON: None. HISTORY: ORDERING SYSTEM PROVIDED HISTORY: ams TECHNOLOGIST PROVIDED HISTORY: Reason for exam:->ams FINDINGS: The lungs are without acute focal process. There is no effusion or pneumothorax. The cardiomediastinal silhouette is without acute process. The osseous structures are without acute process. No acute process.      CBC:   Lab Results   Component Value Date/Time    WBC 7.0 01/28/2023 04:45 AM    RBC 5.79 01/28/2023 04:45 AM    HGB 16.1 01/28/2023 04:45 AM    HCT 47.8 01/28/2023 04:45 AM    MCV 82.6 01/28/2023 04:45 AM    MCH 27.8 01/28/2023 04:45 AM    MCHC 33.7 01/28/2023 04:45 AM    RDW 13.6 01/28/2023 04:45 AM     01/28/2023 04:45 AM    MPV 12.0 01/28/2023 04:45 AM     BMP:    Lab Results   Component Value Date/Time     01/28/2023 04:45 AM    K 3.4 01/28/2023 04:45 AM     01/28/2023 04:45 AM    CO2 22 01/28/2023 04:45 AM    BUN 12 01/28/2023 04:45 AM    LABALBU 3.9 01/28/2023 04:45 AM    CREATININE 1.0 01/28/2023 04:45 AM    CALCIUM 9.4 01/28/2023 04:45 AM    GFRAA >60 02/25/2019 10:40 AM    LABGLOM >60 01/28/2023 04:45 AM    GLUCOSE 102 01/28/2023 04:45 AM      senna  1 tablet Oral Nightly    potassium chloride  40 mEq Oral BID WC    [Held by provider] enoxaparin  40 mg SubCUTAneous Daily    thiamine  100 mg Oral Daily    ipratropium  0.5 mg Nebulization 4x daily    levETIRAcetam  500 mg Oral BID    sodium chloride flush  10 mL IntraVENous 2 times per day    acetaminophen  650 mg Oral Q6H    metoprolol tartrate  25 mg Oral BID    lisinopril  10 mg Oral Daily     Awake and alert oriented follows simple commands pleasantly confused moving all fours equal pupils equal round reactive to light  Assessment:  Patient Active Problem List   Diagnosis    Congestive heart failure of unknown etiology (HCC)    Sinus tachycardia by electrocardiogram    Abnormal QT interval present on electrocardiogram    Acute diastolic (congestive) heart failure (HCC)    Precordial pain    Essential hypertension    Tobacco abuse    Intracranial bleed (HCC)    Intracranial hemorrhage (HCC)    Hypertensive emergency    Alcohol dependence ([facility] [address]     Plan: Full dose anticoagulation contraindicated will need follow-up MRI imaging in 4 to 6 weeks  Consider acute inpatient rehab   Nabila Hudson MD M.D.

## 2023-01-29 NOTE — PROGRESS NOTES
[address], [address] [place] [address] [address]    Date:2023                                                  Patient Name: Lela Lberon    MRN: [id number]    : 1965    Room: [address] [place] [address]      Evaluating OT: JD Villalpando, [id number]    Referring UMAIR Horton NP  Specific Provider Orders/Date: OT Eval and Treat 22    Diagnosis: Intracranial hemorrhage ([address] [I62.9]  Disorientation [R41.0]  Intracranial bleed ([address] [I62.9]   Surgery: NA     Pertinent Medical History:    History reviewed. No pertinent past medical history.       Past Surgical History:   Procedure Laterality Date    COLONOSCOPY       Precautions:  Fall Risk    Assessment of current deficits    [x] Functional mobility  [x]ADLs  [] Strength               [x]Cognition    [x] Functional transfers   [x] IADLs         [x] Safety Awareness   [x]Endurance    [x] Fine Coordination              [x] Balance      [] Vision/perception   []Sensation     []Gross Motor Coordination  [] ROM  [] Delirium                   [] Motor Control     OT PLAN OF CARE   OT POC based on physician orders, patient diagnosis and results of clinical assessment    Frequency/Duration 1-3 days/wk for 2 weeks PRN   Specific OT Treatment Interventions to include:   * Instruction/training on adapted ADL techniques and AE recommendations to increase functional independence within precautions       * Training on energy conservation strategies, correct breathing pattern and techniques to improve independence/tolerance for self-care routine  * Functional transfer/mobility training/DME recommendations for increased independence, safety, and fall prevention  * Patient/Family education to increase follow through with safety techniques and functional independence  * Recommendation of environmental modifications for increased safety with functional transfers/mobility and ADLs  * Cognitive retraining/development of therapeutic activities to improve problem solving, judgement, memory, and attention for increased safety/participation in ADL/IADL tasks  * Therapeutic exercise to improve motor endurance, ROM, and functional strength for ADLs/functional transfers  * Therapeutic activities to facilitate/challenge dynamic balance, stand tolerance for increased safety and independence with ADLs  * Therapeutic activities to facilitate gross/fine motor skills for increased independence with ADLs  * Delirium prevention/treatment    Recommended Adaptive Equipment:  TBD    Comments: Based on patient's functional performance as stated below and level of assistance needed prior to admission, this therapist believes that the patient would benefit from further skilled OT following hospital stay in an effort to increase safety, functional independence, and quality of life. Home Living: Pt lives alone in a 3rd floor apartment with 2 flights stairs to enter and wide set B rail(s); bed/bath on unit  Bathroom setup: tub shower no safety bars, elevated toilet  Equipment owned: none    Prior Level of Function: independent with ADLs and with IADLs; using no device for functional mobility. Driving: no- mostly walks  Occupation: former     Pain Level: denies pain  Cognition: A&O: 4/4; Follows 1-2 step directions. Latent. Attempts to disguise deficits with humor. Impaired problem solving. Cues needed for initiation/follow-through. Memory: G-   Sequencing: F   Problem solving: F-   Judgement/safety: F-     Functional Assessment: AM-PAC Daily Activity Raw Score: 18/24   Initial Eval Status  Date: 1/29/23 Treatment Status  Date: STGs = LTGs  Time frame: 10-14 days   Feeding Set up A        Grooming SBA  To wash hands- vc's to use soap and to locate/use paper towels.  Cues for problem solving for use of towel dispenser    Independent  while standing up at sink with G sequencing and initiation   UB Dressing Set up A  simulated    Independent    LB Dressing Supervision  To doff/fish socks EOB    Independent    Bathing Supervision  simulated    Independent    Toileting Supervision  simulated    Independent    Bed Mobility  Rolling: Independent   Supine to sit: supervision  Sit to supine: supervision  Independent    Functional Transfers Sit to stand: Supervision  Stand to sit: Supervision  C/o some dizziness with positional change standing to sitting  Independent    Functional Mobility SBA no AE  For in-room distance  Independent  for community distances   Balance Sitting:     Static:  Independent     Dynamic:supervision  Standing: supervision     Endurance/Activity Tolerance Good-     Visual/  Perceptual Glasses: none   -reports he \"needs glasses\" at baseline. Peripheral vision screen wfl; not comprehending commands to appropriately complete visual scanning screen    *Continue to monitor for possible visual deficits during functional activities           Hand Dominance R   AROM (PROM) Strength Additional Info:    RUE  WFL 4/5 good  and wfl FMC/dexterity noted during ADL tasks   LUE WFL 4/5 good  and wfl FMC/dexterity noted during ADL tasks     Hearing: appears wfl  Sensation:  No c/o numbness or tingling  Tone: WNL  Edema: unremarkable                            Comments:  Upon arrival patient seated EOB, just completing PT evaluation. Patient cognition as stated above- presents with problem solving difficulties that likely will impact abilities to complete higher level IADLs. There are potential visual/perceptual deficits as well. Patient lives alone and reports there are no friends/family that can assist him as needed. LB dressing, bed mobility, functional transfers/mobility, standing grooming addressed. Coordination appears wfl. When looking at self in mirror, patient perseverative regarding his appearance and his facial hair.  When asked if he wanted to comb or brush disheveled hair he declined. After session, patient long sitting in bed with all devices within reach, all lines and tubes intact. Pt required cues and education as noted above for safe facilitation and completion of tasks. Therapist provided skilled monitoring of SpO2, HR, and patient's response during treatment session. Prior to and at the end of session, environmental modifications/line management completed for patients safety and efficiency of treatment session. Overall, patient demonstrates mild difficulties with completion of BADLs and moderate difficulty with IADLs. Factors contributing to these difficulties include impaired cognition, possible visual impairments, decreased endurance, and generalized weakness. As noted above, patient likely to benefit from further OT intervention to increase independence, safety, and overall quality of life. Eval Complexity:   Low Complexity    Treatment:   Bed mobility: Facilitated bed mobility with cues for proper body mechanics and sequencing to prepare for ADL completion. Functional transfers: Facilitated transfers from various surfaces with cues for body alignment, safety and hand placement. ADL completion: Self-care retraining for the above-mentioned ADLs; training on proper hand placement, safety technique, sequencing, and energy conservation techniques. Cognitive/Perceptual training: retraining exercises to improve attention, mentation, and spatial awareness for ADLs & transfers. Delirium Prevention/Management: Implementation of non-pharmacologic interventions for delirium prevention incorporated throughout session to patient. Including environmental and sensory modifications to decrease patient's internal distraction caused by delirium, and improve overall mentation.     Rehab Potential:  Good for established goals     Patient / Family Goal: None stated      Patient and/or family were instructed on functional diagnosis, prognosis/goals and OT plan of care. Demonstrated fair understanding. Eval Complexity: Low      Time In:0757  Time Out: 0820  Total Time: 23 minutes    Min Units   OT Eval Low 97165  X 1    OT Eval Medium 39854      OT Eval High 30567       OT Re-Eval [id number]       Therapeutic Ex 72120       Therapeutic Activities 39864       ADL/Self Care 28532  8 1    Orthotic Management 63521       Neuro Re-Ed 92256       Non-Billable Time          Evaluation Time additionally includes thorough review of current medical information, gathering information on past medical history/social history and prior level of function, completion of standardized testing/informal observation of tasks, assessment of data and education on plan of care and goals.     Sen Christianson, OTR/L  [id number]

## 2023-01-29 NOTE — PROGRESS NOTES
Physical Therapy    Initial Assessment     Name: Kassidy Bacon  : 1965  MRN: [id number]      Date of Service: 2023    Evaluating PT: Martha Gallo PT, DPT [id number]      Room #:  4390/7711-T  Diagnosis:  Intracranial hemorrhage ([facility] [address] [I62.9]  Disorientation [R41.0]  Intracranial bleed ([address] [I62.9]  PMHx/PSHx:   has no past medical history on file. Precautions:  Fall Risk, ICH, Alarm    SUBJECTIVE:    Pt lives alone in a 3rd floor apartment. 2 full flights of stairs and 2 wide rails to apartment level. Pt ambulated without AD prior to admission. OBJECTIVE:   Initial Evaluation  Date: 23 Treatment Date: Short Term/ Long Term   Goals   AM-PAC 6 Clicks      Was pt agreeable to Eval/treatment? Yes     Does pt have pain? No current complaints of pain     Bed Mobility  Rolling: NT  Supine to sit: SBA  Sit to supine: NT  Scooting: SBA to EOB  Rolling: Independent   Supine to sit: Independent   Sit to supine: Independent   Scooting: Independent    Transfers Sit to stand: SBA  Stand to sit: SBA  Stand pivot: SBA without AD  Sit to stand: Independent   Stand to sit: Independent   Stand pivot: Independent    Ambulation   75 feet x2 without AD with SBA  >400 feet Independent    Stair negotiation: ascended and descended 4 step(s) with 1 rail(s) with SBA  >12 step(s) with 1 rail(s) Mod Independent    ROM BUE: Refer to OT note  BLE: WFL     Strength BUE: Refer to OT note  BLE: 4+/5 grossly     Balance Sitting EOB: Supervision  Dynamic Standing: SBA without AD  Sitting EOB: Independent   Dynamic Standing: Independent      Pt is A & O x: 3 grossly to person, place, and month. Pt required cues/multiple attempts to recall year. Sensation: Pt denies numbness and tingling of extremities. Edema: Unremarkable. Patient education  Pt educated on PT role in acute care setting.     Patient response to education:   Pt verbalized understanding Pt demonstrated skill Pt requires further education in this area   Yes NA No     ASSESSMENT:    Conditions Requiring Skilled Therapeutic Intervention:    [x]Decreased strength     []Decreased ROM  [x]Decreased functional mobility  [x]Decreased balance   []Decreased endurance   []Decreased posture  []Decreased sensation  []Decreased coordination   []Decreased vision  []Decreased safety awareness   []Increased pain       Comments:    Pt was in bed upon room entry; agreeable to PT evaluation. Pt was cooperative. Pt ambulated in hallway without AD. Gait was slow but steady. No LOB occurred and pt denied dizziness throughout session. Pt negotiated stairs with reciprocal step pattern and was mildly unsteady. Pt ambulated back to room and was seated at EOB. OT arrived for evaluation. Pt was left seated with all needs met at conclusion of session. Treatment:  Patient practiced and was instructed in the following treatment:    Therapeutic activities:  Ambulation: Pt ambulated x2 reps without AD. Pt was cued for safety. Stairs: Pt was cued for safety when negotiating stairs. Pt's/family goals:  1. To return home. Prognosis is Good for reaching above PT goals. Patient and or family understand(s) diagnosis, prognosis, and plan of care. Yes. PHYSICAL THERAPY PLAN OF CARE:    PT POC is established based on physician order and patient diagnosis     Referring provider/PT Order:    Start   Ordering Provider    01/28/23 0830  PT eval and treat  Start:  01/28/23 0830,   End:  01/28/23 0830,   ONE TIME,   Standing Count:  1 Occurrences,   R         Tori Wall Patient, APRN - NP      Diagnosis:  Intracranial hemorrhage ([address] [I62.9]  Disorientation [R41.0]  Intracranial bleed ([address] [I62.9]  Specific instructions for next treatment:  Progress activity.     Current Treatment Recommendations:     [x] Strengthening to improve independence with functional mobility   [] ROM to improve independence with functional mobility   [x] Balance Training to improve static/dynamic balance and to reduce fall risk  [x] Endurance Training to improve activity tolerance during functional mobility   [x] Transfer Training to improve safety and independence with all functional transfers   [x] Gait Training to improve gait mechanics, endurance and assess need for appropriate assistive device  [x] Stair Training in preparation for safe discharge home and/or into the community   [] Positioning to prevent skin breakdown and contractures  [x] Safety and Education Training   [] Patient/Caregiver Education   [] HEP  [] Other     PT long term treatment goals are located in above grid    Frequency of treatments: 2-5x/week x 1-2 weeks. Time in  0735  Time out  0800    Total Treatment Time  10 minutes     Evaluation Time includes thorough review of current medical information, gathering information on past medical history/social history and prior level of function, completion of standardized testing/informal observation of tasks, assessment of data and education on plan of care and goals.     CPT codes:  [x] Low Complexity PT evaluation 47243  [] Moderate Complexity PT evaluation 04340  [] High Complexity PT evaluation 59736  [] PT Re-evaluation 94647  [] Gait training 41485 0 minutes  [] Manual therapy 38501 0 minutes  [x] Therapeutic activities 85263 10 minutes  [] Therapeutic exercises 90403 0 minutes  [] Neuromuscular reeducation 63531 0 minutes     Samira Richard, PT, DPT  [id number]

## 2023-01-29 NOTE — PROGRESS NOTES
[facility]  Internal Medicine Residency Program  Progress Note - House Team     Patient:  Lennox Peak 59 y.o. male MRN: [id number]     Date of Service: 1/29/2023     Days since admission: 01/25/0223    Subjective     Overnight events: This morning the patient was seen and examined at bedside in no acute distress. Overnight he was transferred from the SICU to the general medical floor. No concerns this morning. The patient reports that he is able to eat, drink, move his bowels without any issue. NO loss of conspicuousness, chest pain, syncope or presyncope reported. Objective     Physical Exam:  Vitals: BP (!) 141/100   Pulse 73   Temp 97.3 °F (36.3 °C) (Temporal)   Resp 20   Ht 6' 3\" (1.905 m)   Wt 190 lb (86.2 kg)   SpO2 98%   BMI 23.75 kg/m²     I & O - 24hr:   Intake/Output Summary (Last 24 hours) at 1/29/2023 1002  Last data filed at 1/28/2023 2000  Gross per 24 hour   Intake 550 ml   Output --   Net 550 ml      General Appearance: alert, appears stated age, and cooperative  HEENT:  Head: Normocephalic, no lesions, without obvious abnormality. Head: Normal, normocephalic, atraumatic. Eye: Normal external eye, conjunctiva, lids cornea, GERALDINE. Lung: clear to auscultation bilaterally  Heart: regular rate and rhythm, S1, S2 normal, no murmur, click, rub or gallop  Abdomen: soft, non-tender; bowel sounds normal; no masses,  no organomegaly  Extremities:  extremities normal, atraumatic, no cyanosis or edema  Musculokeletal: No joint swelling, no muscle tenderness. ROM normal in all joints of extremities.    Neurologic: Mental status: Alert, oriented x3, thought content appropriate, strength 5/5 in both upper and lower extremities, sensation intact to light touch in both extremities and face, CN-II-XII intact, forward flexion and extension intact in both extremities, reflexes 3+ in the both upper and lower extremities     Subjective  Pertinent Information & Imaging Studies, Consults CBC with Differential:    Lab Results   Component Value Date/Time    WBC 7.0 01/28/2023 04:45 AM    RBC 5.79 01/28/2023 04:45 AM    HGB 16.1 01/28/2023 04:45 AM    HCT 47.8 01/28/2023 04:45 AM     01/28/2023 04:45 AM    MCV 82.6 01/28/2023 04:45 AM    MCH 27.8 01/28/2023 04:45 AM    MCHC 33.7 01/28/2023 04:45 AM    RDW 13.6 01/28/2023 04:45 AM    LYMPHOPCT 31.9 01/28/2023 04:45 AM    MONOPCT 8.7 01/28/2023 04:45 AM    BASOPCT 1.0 01/28/2023 04:45 AM    MONOSABS 0.61 01/28/2023 04:45 AM    LYMPHSABS 2.24 01/28/2023 04:45 AM    EOSABS 0.29 01/28/2023 04:45 AM    BASOSABS 0.07 01/28/2023 04:45 AM     CMP:    Lab Results   Component Value Date/Time     01/28/2023 04:45 AM    K 3.4 01/28/2023 04:45 AM     01/28/2023 04:45 AM    CO2 22 01/28/2023 04:45 AM    BUN 12 01/28/2023 04:45 AM    CREATININE 1.0 01/28/2023 04:45 AM    GFRAA >60 02/25/2019 10:40 AM    LABGLOM >60 01/28/2023 04:45 AM    GLUCOSE 102 01/28/2023 04:45 AM    PROT 6.3 01/28/2023 04:45 AM    LABALBU 3.9 01/28/2023 04:45 AM    CALCIUM 9.4 01/28/2023 04:45 AM    BILITOT 0.8 01/28/2023 04:45 AM    ALKPHOS 66 01/28/2023 04:45 AM    AST 47 01/28/2023 04:45 AM    ALT 45 01/28/2023 04:45 AM       IMAGING:   Imaging Studies:    CT HEAD WO CONTRAST    Result Date: 1/25/2023  1. There is hemorrhage within the right frontal lobe which could represent intraparenchymal hemorrhage or hemorrhagic mass. There is surrounding edema with mass effect and no midline shift. 2. Possible non hemorrhagic mass in the left frontal lobe. 3. Chronic small vessel ischemic disease. Findings discussed with Dr. Yimi Colvin via telephone on 01/25/2023 at 1501 hours [facility] Standard time. RECOMMENDATIONS: Recommend MRI brain without and with intravenous contrast.     CT HEAD W CONTRAST    Result Date: 1/26/2023  1. Similar-appearing peripherally enhancing right frontal intraparenchymal hemorrhage with associated mild mass effect. No suggestion of active extravasation.  2. Similar-appearing peripherally enhancing left frontal hypodense area. No significant associated mass effect. RECOMMENDATIONS: If clinical concerns persist, can consider further evaluation with contrast-enhanced MRI if no contraindications. XR CHEST PORTABLE    Result Date: 1/25/2023  No acute process. MRI BRAIN W WO CONTRAST    Result Date: 1/27/2023  1. Acute hemorrhage present in the right frontal lobe with mild surrounding edema. 2. Late subacute hemorrhage present in the left frontal lobe and to a lesser degree in the high right frontal lobe. No significant associated edema. 3. Involutional parenchymal changes with severe microvascular ischemic disease and scattered chronic lacunar infarcts. 4. Scattered foci of susceptibility likely represent microhemorrhages, most commonly seen in setting of hypertensive microangiopathy. 5. No evidence of acute infarct, enhancing mass lesion, midline shift, or ventriculomegaly. Notable Cultures:      Blood cultures   Blood Culture, Routine   Date Value Ref Range Status   01/25/2023 24 Hours no growth  Preliminary     Respiratory cultures No results found for: RESPCULTURE No results found for: LABGRAM  Urine No results found for: LABURIN  Legionella No results found for: LABLEGI  C Diff PCR No results found for: CDIFPCR  Wound culture/abscess: No results for input(s): WNDABS in the last 72 hours. Tip culture:No results for input(s): CXCATHTIP in the last 72 hours. Resident's Assessment and Plan   Summary of hospital stay   Came in with CC of altered mental status - but was not altered when seen by me - heat CT showed acute intraparenchymal hemorrhage - that we could be due to amyloid angiopathy vs uncontrolled HTN vs weakness in the cerebral vessels - started on Cardene drip which was discontinued on day #2, neurosurgery consulted and no plan for intervention, MRI ordered and showed late subacute hemorrhage in the left frontal lobe as well.  BP remained well controlled in SICU with lisinopril, metoprolol and PRNs - neurologically intact during that time. Neurology consulted and plans to start on ASA in 4 weeks, thiamine due to his history of alcohol intake, Keppra for seizure prophylaxis, statin for LDL goal <70 and he was transferred to the general medical floor. No other major issue since admission so far. Consults:   Neurosurgery   SICU     Assessment:  Acute intraparenchymal hemorrhage - right frontal lobe   Superimposed on late subacute hemorrhage in the left frontal lobe   Low threshold for deterioration  Elevate head of the bed, neurovascular checks  Currently stable neurologically speaking   BP continues to remain stable   Avoid anticoagulation/antiplatelets  Continue to monitor BP   Off of Cardene drip   Continue with [address]   Neurology consulted and recommends that the patient be placed on statin with LDL goal of <70   Thiamine, ASA within 4 weeks      Elevated anion gap   Elevated AG - corrected normal      HTN - BP stable  Goal 140/90 mmHg   On lisinopril and metoprolol   PRNs - hydralazine and labetalol   Continue to monitor blood pressure in the setting of intraparenchymal hemorrhage    Hx of COPD -stable  No PFT on file  Albuterol and aclidinium inhalers on med list  Currently not taking     Hx of HFpEF  EF 60-65% (TTE 2019)  Currently euvolemic -stable     History of tobacco abuse  Reports that he smokes 1 pack a day  Not willing to quit       PT/OT: Not indicated at this time  DVT ppx: PCD   GI ppx: Not indicated at this time      Code Status: Full code   Disposition: Continue current care     Ita Gamble MD, PGY-1  Attending physician: Dr. Kalpana Miller    NOTE: This report was transcribed using voice recognition software. Every effort was made to ensure accuracy; however, inadvertent computerized transcription errors may be present.

## 2023-01-30 VITALS
HEIGHT: 75 IN | DIASTOLIC BLOOD PRESSURE: 83 MMHG | HEART RATE: 76 BPM | TEMPERATURE: 97.3 F | OXYGEN SATURATION: 94 % | BODY MASS INDEX: 23.62 KG/M2 | RESPIRATION RATE: 16 BRPM | SYSTOLIC BLOOD PRESSURE: 147 MMHG | WEIGHT: 190 LBS

## 2023-01-30 LAB
ALBUMIN SERPL-MCNC: 3.8 G/DL (ref 3.5–5.2)
ALP BLD-CCNC: 64 U/L (ref 40–129)
ALT SERPL-CCNC: 51 U/L (ref 0–40)
ANION GAP SERPL CALCULATED.3IONS-SCNC: 10 MMOL/L (ref 7–16)
AST SERPL-CCNC: 36 U/L (ref 0–39)
BILIRUB SERPL-MCNC: 0.6 MG/DL (ref 0–1.2)
BLOOD CULTURE, ROUTINE: NORMAL
BUN BLDV-MCNC: 18 MG/DL (ref 6–23)
CALCIUM IONIZED: 1.32 MMOL/L (ref 1.15–1.33)
CALCIUM SERPL-MCNC: 8.9 MG/DL (ref 8.6–10.2)
CHLORIDE BLD-SCNC: 105 MMOL/L (ref 98–107)
CO2: 26 MMOL/L (ref 22–29)
CREAT SERPL-MCNC: 0.9 MG/DL (ref 0.7–1.2)
CULTURE, BLOOD 2: NORMAL
GFR SERPL CREATININE-BSD FRML MDRD: >60 ML/MIN/1.73
GLUCOSE BLD-MCNC: 102 MG/DL (ref 74–99)
HCT VFR BLD CALC: 45.6 % (ref 37–54)
HEMOGLOBIN: 14.8 G/DL (ref 12.5–16.5)
LV EF: 58 %
LVEF MODALITY: NORMAL
MAGNESIUM: 1.9 MG/DL (ref 1.6–2.6)
MCH RBC QN AUTO: 27.9 PG (ref 26–35)
MCHC RBC AUTO-ENTMCNC: 32.5 % (ref 32–34.5)
MCV RBC AUTO: 86 FL (ref 80–99.9)
PDW BLD-RTO: 13.5 FL (ref 11.5–15)
PHOSPHORUS: 2.9 MG/DL (ref 2.5–4.5)
PLATELET # BLD: 200 E9/L (ref 130–450)
PMV BLD AUTO: 11.3 FL (ref 7–12)
POTASSIUM SERPL-SCNC: 4.1 MMOL/L (ref 3.5–5)
RBC # BLD: 5.3 E12/L (ref 3.8–5.8)
SODIUM BLD-SCNC: 141 MMOL/L (ref 132–146)
TOTAL PROTEIN: 5.9 G/DL (ref 6.4–8.3)
WBC # BLD: 4.7 E9/L (ref 4.5–11.5)

## 2023-01-30 PROCEDURE — 93306 TTE W/DOPPLER COMPLETE: CPT

## 2023-01-30 PROCEDURE — 83735 ASSAY OF MAGNESIUM: CPT

## 2023-01-30 PROCEDURE — 6370000000 HC RX 637 (ALT 250 FOR IP): Performed by: SURGERY

## 2023-01-30 PROCEDURE — 84100 ASSAY OF PHOSPHORUS: CPT

## 2023-01-30 PROCEDURE — 85027 COMPLETE CBC AUTOMATED: CPT

## 2023-01-30 PROCEDURE — 6370000000 HC RX 637 (ALT 250 FOR IP): Performed by: PSYCHIATRY & NEUROLOGY

## 2023-01-30 PROCEDURE — 36415 COLL VENOUS BLD VENIPUNCTURE: CPT

## 2023-01-30 PROCEDURE — 6360000002 HC RX W HCPCS: Performed by: SURGERY

## 2023-01-30 PROCEDURE — 2580000003 HC RX 258: Performed by: STUDENT IN AN ORGANIZED HEALTH CARE EDUCATION/TRAINING PROGRAM

## 2023-01-30 PROCEDURE — 6370000000 HC RX 637 (ALT 250 FOR IP): Performed by: STUDENT IN AN ORGANIZED HEALTH CARE EDUCATION/TRAINING PROGRAM

## 2023-01-30 PROCEDURE — 80053 COMPREHEN METABOLIC PANEL: CPT

## 2023-01-30 PROCEDURE — 82330 ASSAY OF CALCIUM: CPT

## 2023-01-30 PROCEDURE — 94640 AIRWAY INHALATION TREATMENT: CPT

## 2023-01-30 PROCEDURE — 99239 HOSP IP/OBS DSCHRG MGMT >30: CPT | Performed by: INTERNAL MEDICINE

## 2023-01-30 RX ORDER — LISINOPRIL 10 MG/1
10 TABLET ORAL DAILY
Qty: 30 TABLET | Refills: 3 | Status: SHIPPED | OUTPATIENT
Start: 2023-01-30

## 2023-01-30 RX ORDER — METOPROLOL SUCCINATE 25 MG/1
25 TABLET, EXTENDED RELEASE ORAL DAILY
Qty: 30 TABLET | Refills: 3 | Status: SHIPPED | OUTPATIENT
Start: 2023-01-30

## 2023-01-30 RX ORDER — LEVETIRACETAM 500 MG/1
500 TABLET ORAL 2 TIMES DAILY
Qty: 6 TABLET | Refills: 0 | Status: SHIPPED | OUTPATIENT
Start: 2023-01-30 | End: 2023-02-02

## 2023-01-30 RX ORDER — LANOLIN ALCOHOL/MO/W.PET/CERES
100 CREAM (GRAM) TOPICAL DAILY
Qty: 30 TABLET | Refills: 3 | Status: SHIPPED | OUTPATIENT
Start: 2023-01-31

## 2023-01-30 RX ADMIN — Medication 10 ML: at 09:36

## 2023-01-30 RX ADMIN — LEVETIRACETAM 500 MG: 500 TABLET, FILM COATED ORAL at 09:34

## 2023-01-30 RX ADMIN — Medication 100 MG: at 09:34

## 2023-01-30 RX ADMIN — LISINOPRIL 10 MG: 10 TABLET ORAL at 09:34

## 2023-01-30 RX ADMIN — IPRATROPIUM BROMIDE 0.5 MG: 0.5 SOLUTION RESPIRATORY (INHALATION) at 10:34

## 2023-01-30 RX ADMIN — METOPROLOL TARTRATE 25 MG: 25 TABLET, FILM COATED ORAL at 09:34

## 2023-01-30 RX ADMIN — ACETAMINOPHEN 650 MG: 325 TABLET ORAL at 09:34

## 2023-01-30 ASSESSMENT — PAIN DESCRIPTION - DESCRIPTORS: DESCRIPTORS: DISCOMFORT;DULL

## 2023-01-30 ASSESSMENT — PAIN SCALES - GENERAL: PAINLEVEL_OUTOF10: 6

## 2023-01-30 ASSESSMENT — PAIN DESCRIPTION - LOCATION: LOCATION: HEAD

## 2023-01-30 NOTE — PROGRESS NOTES
[facility]  Internal Medicine Residency Program  Progress Note - House Team     Patient:  Junie Webb 59 y.o. male MRN: [id number]     Date of Service: 1/30/2023     CC: Altered mental status  Overnight events: No significant event overnight. The patient blood pressure remains within range. Subjective     The patient was seen by the bedside in the AM.  The patient was alert oriented x3 and responding appropriately. The patient was having his breakfast.  He denied any new complaints. Objective     Physical Exam:  Vitals: BP (!) 147/83   Pulse 59   Temp 97.3 °F (36.3 °C) (Temporal)   Resp 18   Ht 6' 3\" (1.905 m)   Wt 190 lb (86.2 kg)   SpO2 94%   BMI 23.75 kg/m²     I & O - 24hr: No intake/output data recorded. Constitutional: Alert, appears stated age follows commands. In no apparent distress. Head: Normocephalic and atraumatic. Eyes: Conjunctiva normal, (-) scleral icterus. Mucus membranes moist.  Mouth: Mucus membranes moist. Oropharynx clear. No deviation of the tongue or uvula. Neck: (-)  swelling,   Respiratory: Lungs clear to auscultation bilaterally. (-)  wheezes, (-)  rales, (-)  rhonchi. Cardiovascular: RRR. (-)  murmurs, (-) gallops,  (-) rubs. S1 and S2 were normal.   GI:  Abdomen soft, non-tender, non-distended. (+) BS. (-) guarding, (-) rigidity. Extremities: Warm and well perfused. (-) clubbing, (-) cyanosis. (-) peripheral edema. Neurologic:  No focal neurological deficits. No speech slurring or tremor. Alert oriented x3. Strength 5/5 in both upper and lower extremities. Sensation intact to light touch in both extremities. Kidney nerves II to XII intact.     Subjective  Pertinent Labs & Imaging Studies     CBC with Differential:    Lab Results   Component Value Date/Time    WBC 4.7 01/30/2023 04:26 AM    RBC 5.30 01/30/2023 04:26 AM    HGB 14.8 01/30/2023 04:26 AM    HCT 45.6 01/30/2023 04:26 AM     01/30/2023 04:26 AM    MCV 86.0 01/30/2023 04:26 AM    MCH 27.9 01/30/2023 04:26 AM    MCHC 32.5 01/30/2023 04:26 AM    RDW 13.5 01/30/2023 04:26 AM    LYMPHOPCT 31.9 01/28/2023 04:45 AM    MONOPCT 8.7 01/28/2023 04:45 AM    BASOPCT 1.0 01/28/2023 04:45 AM    MONOSABS 0.61 01/28/2023 04:45 AM    LYMPHSABS 2.24 01/28/2023 04:45 AM    EOSABS 0.29 01/28/2023 04:45 AM    BASOSABS 0.07 01/28/2023 04:45 AM     CMP:    Lab Results   Component Value Date/Time     01/30/2023 04:26 AM    K 4.1 01/30/2023 04:26 AM     01/30/2023 04:26 AM    CO2 26 01/30/2023 04:26 AM    BUN 18 01/30/2023 04:26 AM    CREATININE 0.9 01/30/2023 04:26 AM    GFRAA >60 02/25/2019 10:40 AM    LABGLOM >60 01/30/2023 04:26 AM    GLUCOSE 102 01/30/2023 04:26 AM    PROT 5.9 01/30/2023 04:26 AM    LABALBU 3.8 01/30/2023 04:26 AM    CALCIUM 8.9 01/30/2023 04:26 AM    BILITOT 0.6 01/30/2023 04:26 AM    ALKPHOS 64 01/30/2023 04:26 AM    AST 36 01/30/2023 04:26 AM    ALT 51 01/30/2023 04:26 AM       Resident's Assessment and Plan     Isaiah Freeman is a 59 y.o. male    Acute intraparenchymal hemorrhage-right frontal lobe 2/2 most likely from cerebral hypertensive microangiopathy  Superimposed on late subacute hemorrhage in the frontal lobe  Continue to keep blood pressure within range of 140/90. On metoprolol and lisinopril. As needed's in place. Continue Keppra for for 7 days after bleed. Thiamine, ASA within 4 weeks. Continue statin with LDL goal of less than 70. Patient is off of Cardene drip. Avoid anticoagulation/antiplatelets. Neurology has signed off, follow recommendations  Neurosurgery on board. Hypertension  Blood pressure stable for now, goal 140/90. On lisinopril and metoprolol. As needed's, hydralazine and labetalol. Continue to monitor blood pressure in the setting of intraparenchymal hemorrhage. History of COPD  No PFTs on file  Inhalers on med list.  Currently not taking.     History of tobacco abuse  Patient not willing to quit    History of HFpEF  Ejection fraction 60 to 65% TTE 2019  Currently euvolemic-stable    PT/OT evaluation: Not indicated at this time  DVT prophylaxis: PCD's  GI prophylaxis: Not indicated at this time  Disposition: Continue current care, will discuss discharge. Lanie Levine MD, PGY-1  Attending physician: Dr. Porsha Moya   Attending Physician Statement:  Siria Ramirez M.D., F.A.C.P. I have discussed the case, including pertinent history and exam findings with the resident. I have seen and examined the patient--agree with the resident assessment of ROS, PMHx, PSHx, meds reviewed and assessment--Remainder of medical problems as per resident note. Hospital charts reviewed, including other providers notes, relevant labs and imaging. >50% of time spent coordinating care with residents, other providers and/or counseling patient   re: workup/plan and orders as documented by the resident   For billing--Using Medical decision making level of complexity and time spent on case    Multiple different age-- bleeds intracerebral b/l  No hx of trauma or prior CVA;, bleeds  Wasn't taking any home needs  +drug screen negative     Bp not very high in ER  Off cardine drip-- BP <140 goal   - BP hasn't been running high-- better on ACE/betablocker  Rule oout Multiple ?embolic CVA different ages  +/- blood vessel weakness ? HTN +/- amyloid angioapthy. For now etiology suspected to be hypertensive bleeds vs PRESS  +edema on imaging noted. Defer statin, ASA (not 2nd prevention - defer)    Irn 1.3  No AVN, no aneursyms seen  Carotids negative- but mild plaque  Grey scale images demonstrate mild plaque identified in the right and   left carotid arteries. echo planned    No afib noted  Rule out embolic CVA etc..     Inc liver/?hepatic [id number]-- more stable now  Also on keppra- possible cause, wean off keppra (NS if they want)- no seizures  Controversial role here. -- plan 2-3 more days     BS borderline elevated, check aic- -preDM aic 5.7- defer meds  Check lipids--< ldl 70  Statin use controversial in- defer statin  Also statin + brain bleeds-- would recommend to discontinue based on literature review-- but will defer neuro if they feel indicated to start    Tobacco cessation- discussed  Ok to DC sp echo-- will need fu otupatient  DC time >30min

## 2023-01-30 NOTE — PLAN OF CARE
Patient's chart updated to reflect:      . - HF care plan, HF education points and HF discharge instructions.  -Orders: 2 gram sodium diet, daily weights, I/O.  -PCP and/or Cardiologist appointment to be scheduled within 7 days of hospital discharge.  -History of HF, not primary admission Dx. Patient admitted for treatment of   IMPRESSION  1. Intracranial hemorrhage ([facility] [address]    2.  Disorientation       Logan Thompson RN RN, BSN  Heart Failure Navigator

## 2023-01-30 NOTE — DISCHARGE INSTRUCTIONS
Internal medicine    Follow ups  Please follow up with your primary care physician ( [email or web address]) within 10 days of discharge from hospital. Please call as soon as possible to make an appointment. Please contact the internal medicine clinic for an appointment if you are unable to get an appointment with your PCP. Please keep all other follow up appointments:  Future Appointments   Date Time Provider Katie Lewisi   2/8/2023  8:00 AM Shanta Pappas MD [facility]   Also follow-up with neurosurgery. Changes in healthcare   Please take all medications as indicated  Diet: regular diet   Activity: activity as tolerated  New Medications started during this hospital stay  Levetiracetam 500 mg twice daily  Metoprolol succinate 25 mg, 1 tablet by mouth daily  Lisinopril 10 mg, 1 tablet by mouth daily  Thiamine 100 mg, 1 tablet by mouth daily. Changes to your medications  Stop taking aspirin, restart after talking with neurosurgery. Start taking   Levetiracetam 500 mg twice daily  Metoprolol succinate 25 mg, 1 tablet by mouth daily  Lisinopril 10 mg, 1 tablet by mouth daily  Thiamine 100 mg, 1 tablet by mouth daily. Medications you should stop taking   Aspirin  Additional labs, testing or imaging needed after discharge   None  Even if you are feeling better and not having symptoms do not stop taking antibiotic earlier then prescribed   Please contact us if you have any concerns, wish to change or make an appointment:  Internal medicine clinic   Phone: [phone]  Fax: [phone]  [address]  Should you have further questions in regards to this visit, you can review your clinical note and after visit summary document on your [employer] account. Other than any new prescriptions given to you today, the list of home medications on this After Visit Summary are based on information provided to us from you and your healthcare providers.  This information, including the list, dose, and frequency of medications is only as accurate as the information you provided. If you have any questions or concerns about your home medications, please contact your Primary Care Physician for further clarification.

## 2023-01-30 NOTE — PROGRESS NOTES
Neurosurg progress note  VITALS:  BP (!) 147/83   Pulse 76   Temp 97.3 °F (36.3 °C) (Temporal)   Resp 16   Ht 6' 3\" (1.905 m)   Wt 190 lb (86.2 kg)   SpO2 94%   BMI 23.75 kg/m²   24HR INTAKE/OUTPUT:    Intake/Output Summary (Last 24 hours) at 1/30/2023 1151  Last data filed at 1/29/2023 1810  Gross per 24 hour   Intake 240 ml   Output --   Net 240 ml     CT HEAD WO CONTRAST    Result Date: 1/25/2023  EXAMINATION: CT OF THE HEAD WITHOUT CONTRAST  1/25/2023 2:12 pm TECHNIQUE: CT of the head was performed without the administration of intravenous contrast. Automated exposure control, iterative reconstruction, and/or weight based adjustment of the mA/kV was utilized to reduce the radiation dose to as low as reasonably achievable. COMPARISON: None. HISTORY: ORDERING SYSTEM PROVIDED HISTORY: ams TECHNOLOGIST PROVIDED HISTORY: Has a \"code stroke\" or \"stroke alert\" been called? ->No Reason for exam:->ams Decision Support Exception - unselect if not a suspected or confirmed emergency medical condition->Emergency Medical Condition (MA) FINDINGS: There are hypodensities within the raad. There is patchy and confluent hypoattenuation within the white matter of the bilateral cerebral hemispheres. There is hyperdense hemorrhage in the right frontal lobe which measures approximately 2.9 cm in size. There is surrounding edema with mass effect and effacement of the adjacent sulci. There is mild mass effect on the anterior horn of the right lateral ventricle. There is no midline shift. There is a hypodensity within the left thalamus. There is hypodensity within the anterior corpus callosum. There is possible hypodense intra-axial mass in the left frontal lobe which measures approximately 2.9 cm in size. There is no additional evidence of mass. There are no extra-axial fluid collections. The calvarium is intact. There is mild mucosal thickening within the right frontal sinus.   The remaining visualized paranasal sinuses Subjective   Patient ID: Ifrah is a 34 year old female.    Chief Complaint   Patient presents with   • Congestion     x 3 days   • Diarrhea   • Headache   • fatigue   • Exposure Coronavirus (Covid-19)     Exposed to covid 10 days ago with onset of symptoms 3 days ago no fever no cough no sore throat no uti symptoms. Works as RN on pediatric unit. Daughter ill with same symptoms.    Congestion  Associated symptoms include chills, fatigue, headaches and nausea. Pertinent negatives include no abdominal pain, anorexia, chest pain, congestion, coughing, diaphoresis, fever, myalgias, neck pain, numbness, rash, sore throat, swollen glands, urinary symptoms, vertigo or vomiting.   Diarrhea   Associated symptoms include chills and headaches. Pertinent negatives include no abdominal pain, coughing, fever, myalgias or vomiting.   Headache   Associated symptoms include nausea. Pertinent negatives include no abdominal pain, anorexia, back pain, coughing, dizziness, fever, neck pain, numbness, sore throat, swollen glands or vomiting.   fatigue  Associated symptoms include chills, fatigue, headaches and nausea. Pertinent negatives include no abdominal pain, anorexia, chest pain, congestion, coughing, diaphoresis, fever, myalgias, neck pain, numbness, rash, sore throat, swollen glands, urinary symptoms, vertigo or vomiting.         No past medical history on file.    MEDICATIONS:  No current outpatient medications on file.     No current facility-administered medications for this visit.        ALLERGIES:  ALLERGIES:  No Known Allergies    PAST SURGICAL HISTORY:  No past surgical history on file.    FAMILY HISTORY:  No family history on file.    SOCIAL HISTORY:  Social History     Tobacco Use   • Smoking status: Not on file   Substance Use Topics   • Alcohol use: Not on file   • Drug use: Not on file         Patient's medications, allergies, past medical, surgical, and social history  were reviewed and updated as  and mastoid air cells are clear. There is a small sclerotic focus within the right parietal calvarium. 1. There is hemorrhage within the right frontal lobe which could represent intraparenchymal hemorrhage or hemorrhagic mass. There is surrounding edema with mass effect and no midline shift. 2. Possible non hemorrhagic mass in the left frontal lobe. 3. Chronic small vessel ischemic disease. Findings discussed with Dr. Mauricio Aase via telephone on 01/25/2023 at 1501 hours [place] Standard time. RECOMMENDATIONS: Recommend MRI brain without and with intravenous contrast.     CT HEAD W CONTRAST    Result Date: 1/26/2023  EXAMINATION: CT OF THE HEAD WITH CONTRAST  1/26/2023 9:21 am TECHNIQUE: CT of the head/brain was performed with the administration of intravenous contrast. Multiplanar reformatted images are provided for review. Automated exposure control, iterative reconstruction, and/or weight based adjustment of the mA/kV was utilized to reduce the radiation dose to as low as reasonably achievable. COMPARISON: 01/25/2022 CT brain. HISTORY: ORDERING SYSTEM PROVIDED HISTORY: Possible mass TECHNOLOGIST PROVIDED HISTORY: Reason for exam:->Possible mass What reading provider will be dictating this exam?->CRC FINDINGS: BRAIN/VENTRICLES: Similar-appearing peripherally enhancing right frontal intraparenchymal hemorrhage with associated mild mass effect. No suggestion of active extravasation. Similar-appearing peripherally enhancing left frontal hypodense area without significant associated mass effect. No significant midline shift. No ventriculomegaly. No large territorial hypodensity. Involutional parenchymal changes with suggestion of severe microvascular ischemic disease. ORBITS: The visualized portion of the orbits demonstrate no acute abnormality. SINUSES: The visualized paranasal sinuses and mastoid air cells demonstrate no acute abnormality.  SOFT TISSUES/SKULL:  No acute abnormality of the visualized skull or appropriate.    Review of Systems   Constitutional: Positive for chills and fatigue. Negative for diaphoresis and fever.   HENT: Negative for congestion and sore throat.    Respiratory: Negative for cough, chest tightness and shortness of breath.    Cardiovascular: Negative for chest pain, palpitations and leg swelling.   Gastrointestinal: Positive for diarrhea and nausea. Negative for abdominal pain, anorexia and vomiting.   Genitourinary: Negative for dysuria and urgency.   Musculoskeletal: Negative for back pain, myalgias, neck pain and neck stiffness.   Skin: Negative for pallor and rash.   Neurological: Positive for headaches. Negative for dizziness, vertigo and numbness.       Objective   Physical Exam  Vitals signs and nursing note reviewed.   Constitutional:       General: She is not in acute distress.     Appearance: Normal appearance. She is not ill-appearing.   HENT:      Head: Normocephalic and atraumatic.      Nose: No congestion or rhinorrhea.      Mouth/Throat:      Mouth: Mucous membranes are moist.      Pharynx: No oropharyngeal exudate or posterior oropharyngeal erythema.   Eyes:      Extraocular Movements: Extraocular movements intact.      Conjunctiva/sclera: Conjunctivae normal.      Pupils: Pupils are equal, round, and reactive to light.   Neck:      Musculoskeletal: Normal range of motion and neck supple. No muscular tenderness.   Cardiovascular:      Rate and Rhythm: Normal rate and regular rhythm.   Pulmonary:      Effort: Pulmonary effort is normal. No respiratory distress.   Abdominal:      General: Abdomen is flat. There is no distension.   Musculoskeletal: Normal range of motion.   Lymphadenopathy:      Cervical: No cervical adenopathy.   Skin:     General: Skin is warm.      Capillary Refill: Capillary refill takes less than 2 seconds.      Coloration: Skin is not pale.      Findings: No rash.   Neurological:      General: No focal deficit present.      Mental Status: She is alert and  oriented to person, place, and time. Mental status is at baseline.   Psychiatric:         Mood and Affect: Mood normal.       Visit Vitals  /75   Pulse 90   Temp 97.7 °F (36.5 °C) (Temporal)   Resp 16   SpO2 100%       Assessment   Problem List Items Addressed This Visit     None      Visit Diagnoses     Congestion of nasal sinus    -  Primary    Relevant Orders    POCT SARS-COV-2 ANTIGEN (Completed)          The instructions provided as documented in the AVS.    Thank you for visiting [facility].     soft tissues. 1. Similar-appearing peripherally enhancing right frontal intraparenchymal hemorrhage with associated mild mass effect. No suggestion of active extravasation. 2. Similar-appearing peripherally enhancing left frontal hypodense area. No significant associated mass effect. RECOMMENDATIONS: If clinical concerns persist, can consider further evaluation with contrast-enhanced MRI if no contraindications. XR CHEST PORTABLE    Result Date: 1/25/2023  EXAMINATION: ONE XRAY VIEW OF THE CHEST 1/25/2023 1:25 pm COMPARISON: None. HISTORY: ORDERING SYSTEM PROVIDED HISTORY: ams TECHNOLOGIST PROVIDED HISTORY: Reason for exam:->ams FINDINGS: The lungs are without acute focal process. There is no effusion or pneumothorax. The cardiomediastinal silhouette is without acute process. The osseous structures are without acute process. No acute process.      CBC:   Lab Results   Component Value Date/Time    WBC 4.7 01/30/2023 04:26 AM    RBC 5.30 01/30/2023 04:26 AM    HGB 14.8 01/30/2023 04:26 AM    HCT 45.6 01/30/2023 04:26 AM    MCV 86.0 01/30/2023 04:26 AM    MCH 27.9 01/30/2023 04:26 AM    MCHC 32.5 01/30/2023 04:26 AM    RDW 13.5 01/30/2023 04:26 AM     01/30/2023 04:26 AM    MPV 11.3 01/30/2023 04:26 AM     BMP:    Lab Results   Component Value Date/Time     01/30/2023 04:26 AM    K 4.1 01/30/2023 04:26 AM     01/30/2023 04:26 AM    CO2 26 01/30/2023 04:26 AM    BUN 18 01/30/2023 04:26 AM    LABALBU 3.8 01/30/2023 04:26 AM    CREATININE 0.9 01/30/2023 04:26 AM    CALCIUM 8.9 01/30/2023 04:26 AM    GFRAA >60 02/25/2019 10:40 AM    LABGLOM >60 01/30/2023 04:26 AM    GLUCOSE 102 01/30/2023 04:26 AM      senna  1 tablet Oral Nightly    [Held by provider] enoxaparin  40 mg SubCUTAneous Daily    thiamine  100 mg Oral Daily    ipratropium  0.5 mg Nebulization 4x daily    levETIRAcetam  500 mg Oral BID    sodium chloride flush  10 mL IntraVENous 2 times per day    acetaminophen  650 mg Oral Q6H metoprolol tartrate  25 mg Oral BID    lisinopril  10 mg Oral Daily     Remains awake and alert follows commands pupils equal round reactive nonfocal neuro exam but pleasantly confused  Assessment:  Patient Active Problem List   Diagnosis    Congestive heart failure of unknown etiology (HCC)    Sinus tachycardia by electrocardiogram    Abnormal QT interval present on electrocardiogram    Acute diastolic (congestive) heart failure (HCC)    Precordial pain    Essential hypertension    Tobacco abuse    Intracranial bleed (HCC)    Intracranial hemorrhage (HCC)    Hypertensive emergency    Alcohol dependence ([facility] [address]     Plan: Because of his bifrontal injuries and subtle confusion likely poor decision-making he should be with a reliable dull 24/7 or go to acute inpatient rehab for cognitive rehabilitation   Cristy Prieto MD M.D.

## 2023-01-30 NOTE — CARE COORDINATION
SOCIAL WORK/CASEMANAGEMENT TRANSITION OF CARE PLANNINGAlexy Perez, [address]): I met with pt this afternoon. His plan is home with [facility]. Dr. Jensen Egnora office to write [facility] orders and set up a appt  for next week to see pt. [facility] cannot see pt until Saturday. [facility] has accepted pt. Orders are in epic. The rn wants [facility] care to send a car for around 5:30 p.m.  and I called and set that up. They are to call 15 minutes to the floor before arriving allowing rn time to get pt to the door. Trip# [id number].  RADHA Vera  1/30/2023

## 2023-01-30 NOTE — DISCHARGE SUMMARY
[address]  Discharge Summary    PCP: No primary care provider on file. Admit Date:1/25/2023  Discharge Date: 1/30/2023    Chief Complaint   Patient presents with    Other     PATIENT C/O NEAR SYNCOPE PER EMS. PATIENT A&O X 1 PERSON ONLY. Admission Diagnosis:   Intraparenchymal hemorrhage likely 2/2 hypertensive etiology, possible mass lesion  High anion gap metabolic acidosis likely 2/2 dehydration vs poor oral intake  Hx of COPD -stable  Hx of HTN  Hx of HFpEF  History of tobacco abuse    Discharge Diagnosis:  Acute intraparenchymal hemorrhage-right frontal lobe 2/2 most likely from cerebral hypertensive microangiopathy  Hypertension  History of COPD  History of HFpEF  High anion gap metabolic acidosis likely 2/2 dehydration vs poor oral intake- resolved  History of tobacco abuse    Hospital Course: Patient is a 67-year-old male with past medical history of uncontrolled hypertension, tobacco use, HFrEF, COPD no PFTs on file who was brought to the emergency department by EMS due to concerns of altered mental status. Patient following to cigarettes in his, and at the counter he could not explain to the  what he needed, he started repeating same thing over and over and then stopped talking. According to him it lasted 10 to 20 minutes and he was very confused at that time. He denied any emotional trigger, dizziness, nausea, vomiting, vision loss, chest pain, weakness, head trauma and palpitations before the episode. Bystander called EMS and he was transported to the ED for further management. Patient has past medical history of high blood pressure but he has not been taking his medication over the last few months as he has not followed up with PCP. In the ED the patient was hemodynamically stable with blood pressure high into 185/102, temperature 96.1, respiratory rate 14, pulse 68, SPO2 97% on room air.   BMP was significant for metabolic acidosis with bicarb of 18, anion gap 25, blood glucose 124, lactic acid 2.4. CBC showed hemoglobin of 17.4. CT head without contrast showed hemorrhage in the right frontal lobe, surrounding edema with mass-effect and no midline shift. Possible nonhemorrhagic stroke in the left frontal lobe. Chronic small vessel disease. Patient was started on nicardipine drip and Keppra for seizure prophylaxis. Patient was admitted to the SICU. For intracranial hemorrhage:   Patient was started on nicardipine drip, which was stopped after 10 hours as blood pressure stabilized. He was started on lisinopril and metoprolol succinate  Patient was also started on Keppra prophylactically. Patient MRI showed:   1. Acute hemorrhage present in the right frontal lobe with mild surrounding   edema. 2. Late subacute hemorrhage present in the left frontal lobe and to a lesser   degree in the high right frontal lobe. No significant associated edema. 3. Involutional parenchymal changes with severe microvascular ischemic   disease and scattered chronic lacunar infarcts. 4. Scattered foci of susceptibility likely represent microhemorrhages, most   commonly seen in setting of hypertensive microangiopathy. 5. No evidence of acute infarct, enhancing mass lesion, midline shift, or   ventriculomegaly. No surgical intervention per neurosurgery. Neurology was also consulted, which recommend ASA 81 mg once cleared for antiplatelets by neurosurgery. Thiamine 100 mg at bedtime, Keppra 500 twice daily for 7 days, and blood pressure control with goal of 140/90. Patient also had echo done, which showed indeterminate diastolic dysfunction and negative for left ventricular thrombus, no PFO and no wall motion abnormalities. For hypertension the patient was resumed on metoprolol. Rest of the chronic complaints were stable during hospital course.     The patient will be discharged on the following medications:  New Medications started during this hospital stay  Levetiracetam 500 mg twice daily  Metoprolol succinate 25 mg, 1 tablet by mouth daily  Lisinopril 10 mg, 1 tablet by mouth daily  Thiamine 100 mg, 1 tablet by mouth daily. Stop taking aspirin. Resume with consultation of neurosurgery. The patient will be sent home with home health care, as there is a concern for patient's deterioration. Pending test results: None    Consults:  Neurology  Neurosurgery  CVICU      Procedures:    Condition at discharge: Stable    Disposition: home    Time taken for discharge : < 30 mins [x] >30 mins []    Discharge Medications:  Current Discharge Medication List        START taking these medications    Details   levETIRAcetam (KEPPRA) 500 MG tablet Take 1 tablet by mouth 2 times daily for 3 days  Qty: 6 tablet, Refills: 0      thiamine 100 MG tablet Take 1 tablet by mouth daily  Qty: 30 tablet, Refills: 3           CONTINUE these medications which have CHANGED    Details   metoprolol succinate (TOPROL XL) 25 MG extended release tablet Take 1 tablet by mouth daily  Qty: 30 tablet, Refills: 3      lisinopril (PRINIVIL;ZESTRIL) 10 MG tablet Take 1 tablet by mouth daily  Qty: 30 tablet, Refills: 3           CONTINUE these medications which have NOT CHANGED    Details   nitroGLYCERIN (NITROSTAT) 0.4 MG SL tablet up to max of 3 total doses. If no relief after 3 doses, call 911.   Qty: 25 tablet, Refills: 0      albuterol sulfate  (90 Base) MCG/ACT inhaler Inhale 2 puffs into the lungs 4 times daily as needed for Wheezing  Qty: 1 Inhaler, Refills: 0    Associated Diagnoses: Chronic obstructive pulmonary disease, unspecified COPD type ([facility])      aclidinium (TUDORZA PRESSAIR) 400 MCG/ACT AEPB inhaler Inhale 1 puff into the lungs 2 times daily  Qty: 1 each, Refills: 1           STOP taking these medications       furosemide (LASIX) 20 MG tablet Comments:   Reason for Stopping:         aspirin 81 MG chewable tablet Comments:   Reason for Stopping: Internal medicine    Follow ups  Please follow up with your primary care physician ( [email or web address]) within 10 days of discharge from hospital. Please call as soon as possible to make an appointment. Please contact the internal medicine clinic for an appointment if you are unable to get an appointment with your PCP. Please keep all other follow up appointments:  Future Appointments   Date Time Provider Katie Willams   2/8/2023  8:00 AM Meena Turner MD [facility]   Also follow-up with neurosurgery. Changes in healthcare   Please take all medications as indicated  Diet: regular diet   Activity: activity as tolerated  New Medications started during this hospital stay  Levetiracetam 500 mg twice daily  Metoprolol succinate 25 mg, 1 tablet by mouth daily  Lisinopril 10 mg, 1 tablet by mouth daily  Thiamine 100 mg, 1 tablet by mouth daily. Changes to your medications  Stop taking aspirin, restart after talking with neurosurgery. Start taking   Levetiracetam 500 mg twice daily  Metoprolol succinate 25 mg, 1 tablet by mouth daily  Lisinopril 10 mg, 1 tablet by mouth daily  Thiamine 100 mg, 1 tablet by mouth daily. Medications you should stop taking   Aspirin  Additional labs, testing or imaging needed after discharge   None  Even if you are feeling better and not having symptoms do not stop taking antibiotic earlier then prescribed   Please contact us if you have any concerns, wish to change or make an appointment:  Internal medicine clinic   Phone: [phone]  Fax: [phone]  [address]  Should you have further questions in regards to this visit, you can review your clinical note and after visit summary document on your [employer] account. Other than any new prescriptions given to you today, the list of home medications on this After Visit Summary are based on information provided to us from you and your healthcare providers.  This information, including the list, dose, and frequency of medications is only as accurate as the information you provided. If you have any questions or concerns about your home medications, please contact your Primary Care Physician for further clarification.     Saw Friedman MD  PGY 1   3:28 PM 1/30/2023

## 2023-01-31 NOTE — PROGRESS NOTES
CLINICAL PHARMACY NOTE: MEDS TO BEDS    Total # of Prescriptions Filled: 4   The following medications were delivered to the patient:  B-1   Metoprolol ER 25mg  Levetiracetam 500 mg  Lisinopril 10 mg    Additional Documentation:     Delivered meds to patient

## 2023-11-05 ENCOUNTER — HOSPITAL ENCOUNTER (INPATIENT)
Age: 58
LOS: 2 days | Discharge: HOME | DRG: 305 | End: 2023-11-07
Attending: EMERGENCY MEDICINE | Admitting: INTERNAL MEDICINE
Payer: COMMERCIAL

## 2023-11-05 ENCOUNTER — APPOINTMENT (OUTPATIENT)
Dept: CT IMAGING | Age: 58
DRG: 305 | End: 2023-11-05
Payer: COMMERCIAL

## 2023-11-05 ENCOUNTER — APPOINTMENT (OUTPATIENT)
Dept: GENERAL RADIOLOGY | Age: 58
DRG: 305 | End: 2023-11-05
Payer: COMMERCIAL

## 2023-11-05 DIAGNOSIS — S09.90XA CLOSED HEAD INJURY, INITIAL ENCOUNTER: ICD-10-CM

## 2023-11-05 DIAGNOSIS — R55 SYNCOPE AND COLLAPSE: Primary | ICD-10-CM

## 2023-11-05 DIAGNOSIS — I10 ESSENTIAL HYPERTENSION: ICD-10-CM

## 2023-11-05 LAB
ALBUMIN SERPL-MCNC: 4.3 G/DL (ref 3.5–5.2)
ALP SERPL-CCNC: 93 U/L (ref 40–129)
ALT SERPL-CCNC: 19 U/L (ref 0–40)
AMPHET UR QL SCN: NEGATIVE
ANION GAP SERPL CALCULATED.3IONS-SCNC: 20 MMOL/L (ref 7–16)
APAP SERPL-MCNC: <5 UG/ML (ref 10–30)
AST SERPL-CCNC: 23 U/L (ref 0–39)
BARBITURATES UR QL SCN: NEGATIVE
BASOPHILS # BLD: 0.07 K/UL (ref 0–0.2)
BASOPHILS NFR BLD: 1 % (ref 0–2)
BENZODIAZ UR QL: NEGATIVE
BILIRUB SERPL-MCNC: 1 MG/DL (ref 0–1.2)
BUN SERPL-MCNC: 13 MG/DL (ref 6–23)
BUPRENORPHINE UR QL: NEGATIVE
CALCIUM SERPL-MCNC: 9.9 MG/DL (ref 8.6–10.2)
CANNABINOIDS UR QL SCN: NEGATIVE
CHLORIDE SERPL-SCNC: 95 MMOL/L (ref 98–107)
CO2 SERPL-SCNC: 21 MMOL/L (ref 22–29)
COCAINE UR QL SCN: NEGATIVE
CREAT SERPL-MCNC: 0.9 MG/DL (ref 0.7–1.2)
D DIMER: 227 NG/ML DDU (ref 0–232)
EOSINOPHIL # BLD: 0.27 K/UL (ref 0.05–0.5)
EOSINOPHILS RELATIVE PERCENT: 5 % (ref 0–6)
ERYTHROCYTE [DISTWIDTH] IN BLOOD BY AUTOMATED COUNT: 12.9 % (ref 11.5–15)
ETHANOLAMINE SERPL-MCNC: <10 MG/DL
FENTANYL UR QL: NEGATIVE
GFR SERPL CREATININE-BSD FRML MDRD: >60 ML/MIN/1.73M2
GLUCOSE BLD-MCNC: 99 MG/DL (ref 74–99)
GLUCOSE SERPL-MCNC: 103 MG/DL (ref 74–99)
HCT VFR BLD AUTO: 50.9 % (ref 37–54)
HGB BLD-MCNC: 16.8 G/DL (ref 12.5–16.5)
IMM GRANULOCYTES # BLD AUTO: <0.03 K/UL (ref 0–0.58)
IMM GRANULOCYTES NFR BLD: 0 % (ref 0–5)
LACTATE BLDV-SCNC: 1.6 MMOL/L (ref 0.5–2.2)
LYMPHOCYTES NFR BLD: 1.65 K/UL (ref 1.5–4)
LYMPHOCYTES RELATIVE PERCENT: 33 % (ref 20–42)
MCH RBC QN AUTO: 28.2 PG (ref 26–35)
MCHC RBC AUTO-ENTMCNC: 33 G/DL (ref 32–34.5)
MCV RBC AUTO: 85.4 FL (ref 80–99.9)
METHADONE UR QL: NEGATIVE
MONOCYTES NFR BLD: 0.32 K/UL (ref 0.1–0.95)
MONOCYTES NFR BLD: 6 % (ref 2–12)
NEUTROPHILS NFR BLD: 53 % (ref 43–80)
NEUTS SEG NFR BLD: 2.66 K/UL (ref 1.8–7.3)
OPIATES UR QL SCN: NEGATIVE
OXYCODONE UR QL SCN: NEGATIVE
PCP UR QL SCN: NEGATIVE
PLATELET # BLD AUTO: 394 K/UL (ref 130–450)
PMV BLD AUTO: 10.6 FL (ref 7–12)
POTASSIUM SERPL-SCNC: 3.7 MMOL/L (ref 3.5–5)
PROT SERPL-MCNC: 7.6 G/DL (ref 6.4–8.3)
RBC # BLD AUTO: 5.96 M/UL (ref 3.8–5.8)
SALICYLATES SERPL-MCNC: <0.3 MG/DL (ref 0–30)
SODIUM SERPL-SCNC: 136 MMOL/L (ref 132–146)
TEST INFORMATION: NORMAL
TOXIC TRICYCLIC SC,BLOOD: NEGATIVE
TROPONIN I SERPL HS-MCNC: 15 NG/L (ref 0–11)
TROPONIN I SERPL HS-MCNC: 21 NG/L (ref 0–11)
TROPONIN I SERPL HS-MCNC: 22 NG/L (ref 0–11)
WBC OTHER # BLD: 5 K/UL (ref 4.5–11.5)

## 2023-11-05 PROCEDURE — 96360 HYDRATION IV INFUSION INIT: CPT

## 2023-11-05 PROCEDURE — G0480 DRUG TEST DEF 1-7 CLASSES: HCPCS

## 2023-11-05 PROCEDURE — 2580000003 HC RX 258

## 2023-11-05 PROCEDURE — 70450 CT HEAD/BRAIN W/O DYE: CPT

## 2023-11-05 PROCEDURE — 80179 DRUG ASSAY SALICYLATE: CPT

## 2023-11-05 PROCEDURE — 99285 EMERGENCY DEPT VISIT HI MDM: CPT

## 2023-11-05 PROCEDURE — 71045 X-RAY EXAM CHEST 1 VIEW: CPT

## 2023-11-05 PROCEDURE — 2580000003 HC RX 258: Performed by: EMERGENCY MEDICINE

## 2023-11-05 PROCEDURE — 6370000000 HC RX 637 (ALT 250 FOR IP)

## 2023-11-05 PROCEDURE — 2060000000 HC ICU INTERMEDIATE R&B

## 2023-11-05 PROCEDURE — 6360000002 HC RX W HCPCS

## 2023-11-05 PROCEDURE — 6360000002 HC RX W HCPCS: Performed by: EMERGENCY MEDICINE

## 2023-11-05 PROCEDURE — 80307 DRUG TEST PRSMV CHEM ANLYZR: CPT

## 2023-11-05 PROCEDURE — 93005 ELECTROCARDIOGRAM TRACING: CPT | Performed by: EMERGENCY MEDICINE

## 2023-11-05 PROCEDURE — 85379 FIBRIN DEGRADATION QUANT: CPT

## 2023-11-05 PROCEDURE — 83605 ASSAY OF LACTIC ACID: CPT

## 2023-11-05 PROCEDURE — 85025 COMPLETE CBC W/AUTO DIFF WBC: CPT

## 2023-11-05 PROCEDURE — 80053 COMPREHEN METABOLIC PANEL: CPT

## 2023-11-05 PROCEDURE — 84484 ASSAY OF TROPONIN QUANT: CPT

## 2023-11-05 PROCEDURE — 82962 GLUCOSE BLOOD TEST: CPT

## 2023-11-05 PROCEDURE — 96361 HYDRATE IV INFUSION ADD-ON: CPT

## 2023-11-05 PROCEDURE — 80143 DRUG ASSAY ACETAMINOPHEN: CPT

## 2023-11-05 PROCEDURE — 72125 CT NECK SPINE W/O DYE: CPT

## 2023-11-05 RX ORDER — ACETAMINOPHEN 650 MG/1
650 SUPPOSITORY RECTAL EVERY 6 HOURS PRN
Status: DISCONTINUED | OUTPATIENT
Start: 2023-11-05 | End: 2023-11-07 | Stop reason: HOSPADM

## 2023-11-05 RX ORDER — ONDANSETRON 2 MG/ML
4 INJECTION INTRAMUSCULAR; INTRAVENOUS EVERY 6 HOURS PRN
Status: DISCONTINUED | OUTPATIENT
Start: 2023-11-05 | End: 2023-11-07 | Stop reason: HOSPADM

## 2023-11-05 RX ORDER — ACETAMINOPHEN 325 MG/1
650 TABLET ORAL EVERY 6 HOURS PRN
Status: DISCONTINUED | OUTPATIENT
Start: 2023-11-05 | End: 2023-11-07 | Stop reason: HOSPADM

## 2023-11-05 RX ORDER — LABETALOL HYDROCHLORIDE 5 MG/ML
10 INJECTION, SOLUTION INTRAVENOUS ONCE
Status: COMPLETED | OUTPATIENT
Start: 2023-11-05 | End: 2023-11-05

## 2023-11-05 RX ORDER — ENOXAPARIN SODIUM 100 MG/ML
40 INJECTION SUBCUTANEOUS DAILY
Status: DISCONTINUED | OUTPATIENT
Start: 2023-11-05 | End: 2023-11-07 | Stop reason: HOSPADM

## 2023-11-05 RX ORDER — POLYETHYLENE GLYCOL 3350 17 G/17G
17 POWDER, FOR SOLUTION ORAL DAILY PRN
Status: DISCONTINUED | OUTPATIENT
Start: 2023-11-05 | End: 2023-11-07 | Stop reason: HOSPADM

## 2023-11-05 RX ORDER — LABETALOL HYDROCHLORIDE 5 MG/ML
10 INJECTION, SOLUTION INTRAVENOUS EVERY 4 HOURS PRN
Status: DISCONTINUED | OUTPATIENT
Start: 2023-11-05 | End: 2023-11-07 | Stop reason: HOSPADM

## 2023-11-05 RX ORDER — POTASSIUM CHLORIDE 7.45 MG/ML
10 INJECTION INTRAVENOUS PRN
Status: DISCONTINUED | OUTPATIENT
Start: 2023-11-05 | End: 2023-11-07 | Stop reason: HOSPADM

## 2023-11-05 RX ORDER — LABETALOL HYDROCHLORIDE 5 MG/ML
10 INJECTION, SOLUTION INTRAVENOUS EVERY 6 HOURS PRN
Status: DISCONTINUED | OUTPATIENT
Start: 2023-11-05 | End: 2023-11-05

## 2023-11-05 RX ORDER — LANOLIN ALCOHOL/MO/W.PET/CERES
100 CREAM (GRAM) TOPICAL DAILY
Status: DISCONTINUED | OUTPATIENT
Start: 2023-11-05 | End: 2023-11-07 | Stop reason: HOSPADM

## 2023-11-05 RX ORDER — ONDANSETRON 4 MG/1
4 TABLET, ORALLY DISINTEGRATING ORAL EVERY 8 HOURS PRN
Status: DISCONTINUED | OUTPATIENT
Start: 2023-11-05 | End: 2023-11-07 | Stop reason: HOSPADM

## 2023-11-05 RX ORDER — POTASSIUM CHLORIDE 20 MEQ/1
40 TABLET, EXTENDED RELEASE ORAL PRN
Status: DISCONTINUED | OUTPATIENT
Start: 2023-11-05 | End: 2023-11-07 | Stop reason: HOSPADM

## 2023-11-05 RX ORDER — LABETALOL HYDROCHLORIDE 5 MG/ML
10 INJECTION, SOLUTION INTRAVENOUS EVERY 4 HOURS PRN
Status: DISCONTINUED | OUTPATIENT
Start: 2023-11-05 | End: 2023-11-05

## 2023-11-05 RX ORDER — MAGNESIUM SULFATE IN WATER 40 MG/ML
2000 INJECTION, SOLUTION INTRAVENOUS PRN
Status: DISCONTINUED | OUTPATIENT
Start: 2023-11-05 | End: 2023-11-07 | Stop reason: HOSPADM

## 2023-11-05 RX ORDER — SODIUM CHLORIDE 0.9 % (FLUSH) 0.9 %
5-40 SYRINGE (ML) INJECTION EVERY 12 HOURS SCHEDULED
Status: DISCONTINUED | OUTPATIENT
Start: 2023-11-05 | End: 2023-11-07 | Stop reason: HOSPADM

## 2023-11-05 RX ORDER — ATORVASTATIN CALCIUM 20 MG/1
20 TABLET, FILM COATED ORAL NIGHTLY
Status: DISCONTINUED | OUTPATIENT
Start: 2023-11-05 | End: 2023-11-07 | Stop reason: HOSPADM

## 2023-11-05 RX ORDER — SODIUM CHLORIDE 9 MG/ML
INJECTION, SOLUTION INTRAVENOUS PRN
Status: DISCONTINUED | OUTPATIENT
Start: 2023-11-05 | End: 2023-11-07 | Stop reason: HOSPADM

## 2023-11-05 RX ORDER — 0.9 % SODIUM CHLORIDE 0.9 %
1000 INTRAVENOUS SOLUTION INTRAVENOUS ONCE
Status: COMPLETED | OUTPATIENT
Start: 2023-11-05 | End: 2023-11-05

## 2023-11-05 RX ORDER — SODIUM CHLORIDE 0.9 % (FLUSH) 0.9 %
5-40 SYRINGE (ML) INJECTION PRN
Status: DISCONTINUED | OUTPATIENT
Start: 2023-11-05 | End: 2023-11-07 | Stop reason: HOSPADM

## 2023-11-05 RX ADMIN — ATORVASTATIN CALCIUM 20 MG: 20 TABLET, FILM COATED ORAL at 22:56

## 2023-11-05 RX ADMIN — SODIUM CHLORIDE 1000 ML: 9 INJECTION, SOLUTION INTRAVENOUS at 14:44

## 2023-11-05 RX ADMIN — ENOXAPARIN SODIUM 40 MG: 100 INJECTION SUBCUTANEOUS at 20:39

## 2023-11-05 RX ADMIN — LABETALOL HYDROCHLORIDE 10 MG: 5 INJECTION INTRAVENOUS at 22:57

## 2023-11-05 RX ADMIN — Medication 100 MG: at 20:39

## 2023-11-05 RX ADMIN — SODIUM CHLORIDE, PRESERVATIVE FREE 10 ML: 5 INJECTION INTRAVENOUS at 22:57

## 2023-11-05 RX ADMIN — LABETALOL HYDROCHLORIDE 10 MG: 5 INJECTION INTRAVENOUS at 18:59

## 2023-11-05 RX ADMIN — LABETALOL HYDROCHLORIDE 10 MG: 5 INJECTION INTRAVENOUS at 17:33

## 2023-11-05 ASSESSMENT — PAIN - FUNCTIONAL ASSESSMENT: PAIN_FUNCTIONAL_ASSESSMENT: NONE - DENIES PAIN

## 2023-11-05 NOTE — H&P
394   MPV 10.6     Recent Labs     11/05/23  1443      K 3.7   CL 95*   CO2 21*   BUN 13   CREATININE 0.9   GLUCOSE 103*   CALCIUM 9.9   PROT 7.6   LABALBU 4.3   BILITOT 1.0   ALKPHOS 93   AST 23   ALT 19     Recent Labs     11/05/23  1443 11/05/23  1607 11/05/23  1952   TROPHS 15* 22* 21*         Imaging Studies:    XR CHEST PORTABLE   Final Result   No pneumonia or pleural effusion. CT Head W/O Contrast   Final Result   1. There is no acute intracranial abnormality. Specifically, there is no   intracranial hemorrhage. 2. Advanced atrophy and advanced periventricular leukomalacia,   3. Encephalomalacia  seen within the right and left frontal lobes consistent   with old infarcts. CT CSpine W/O Contrast   Final Result   1. There is no acute compression fracture or subluxation of the cervical   spine. 2. Mild multilevel degenerative disc and degenerative joint disease. Resident's Assessment and Plan     Assessment and Plan:    Hypertensive emergency likely 2/2 medication noncompliance  Patient's blood pressure was elevated on admission, highest being 203/114. . Patient's home blood pressure regimen include Toprol XL 25 mg daily, lisinopril 10 mg daily. Patient has not taken any medication for a long time. Patient has previous history of uncontrolled hypertension. Patient denies chest pain, but troponin elevated to 22  Patient denies any headache, blurring of vision, shortness of breath. Chest x-ray does not show any pulmonary edema. EKG does not show any ST elevation. Plan   Labetalol 10 mg IV every 4 hours, goal decrease in MAP less than 10% in first hour and less than 25% in first 24 hours ( goal MAP >108 in 24 hours). Monitor troponin. Continuous telemetry monitoring. Syncopal episode likely 2/2 cardiogenic versus rule out other causes  Patient complaints of a syncopal episode while waiting for a table in restaurant.   The patient did not feel any

## 2023-11-06 LAB
ANION GAP SERPL CALCULATED.3IONS-SCNC: 9 MMOL/L (ref 7–16)
BASOPHILS # BLD: 0.07 K/UL (ref 0–0.2)
BASOPHILS NFR BLD: 1 % (ref 0–2)
BUN SERPL-MCNC: 15 MG/DL (ref 6–23)
CALCIUM SERPL-MCNC: 9.1 MG/DL (ref 8.6–10.2)
CHLORIDE SERPL-SCNC: 102 MMOL/L (ref 98–107)
CO2 SERPL-SCNC: 29 MMOL/L (ref 22–29)
CREAT SERPL-MCNC: 0.8 MG/DL (ref 0.7–1.2)
EKG ATRIAL RATE: 104 BPM
EKG P AXIS: 78 DEGREES
EKG P-R INTERVAL: 144 MS
EKG Q-T INTERVAL: 352 MS
EKG QRS DURATION: 88 MS
EKG QTC CALCULATION (BAZETT): 462 MS
EKG R AXIS: 12 DEGREES
EKG T AXIS: 25 DEGREES
EKG VENTRICULAR RATE: 104 BPM
EOSINOPHIL # BLD: 0.4 K/UL (ref 0.05–0.5)
EOSINOPHILS RELATIVE PERCENT: 7 % (ref 0–6)
ERYTHROCYTE [DISTWIDTH] IN BLOOD BY AUTOMATED COUNT: 12.9 % (ref 11.5–15)
GFR SERPL CREATININE-BSD FRML MDRD: >60 ML/MIN/1.73M2
GLUCOSE SERPL-MCNC: 125 MG/DL (ref 74–99)
HCT VFR BLD AUTO: 42.5 % (ref 37–54)
HGB BLD-MCNC: 14.2 G/DL (ref 12.5–16.5)
IMM GRANULOCYTES # BLD AUTO: 0.03 K/UL (ref 0–0.58)
IMM GRANULOCYTES NFR BLD: 1 % (ref 0–5)
LYMPHOCYTES NFR BLD: 2.12 K/UL (ref 1.5–4)
LYMPHOCYTES RELATIVE PERCENT: 34 % (ref 20–42)
MAGNESIUM SERPL-MCNC: 2 MG/DL (ref 1.6–2.6)
MCH RBC QN AUTO: 28.5 PG (ref 26–35)
MCHC RBC AUTO-ENTMCNC: 33.4 G/DL (ref 32–34.5)
MCV RBC AUTO: 85.3 FL (ref 80–99.9)
MONOCYTES NFR BLD: 0.57 K/UL (ref 0.1–0.95)
MONOCYTES NFR BLD: 9 % (ref 2–12)
NEUTROPHILS NFR BLD: 49 % (ref 43–80)
NEUTS SEG NFR BLD: 3 K/UL (ref 1.8–7.3)
PHOSPHATE SERPL-MCNC: 2.7 MG/DL (ref 2.5–4.5)
PLATELET # BLD AUTO: 336 K/UL (ref 130–450)
PMV BLD AUTO: 9.9 FL (ref 7–12)
POTASSIUM SERPL-SCNC: 3.4 MMOL/L (ref 3.5–5)
RBC # BLD AUTO: 4.98 M/UL (ref 3.8–5.8)
SODIUM SERPL-SCNC: 140 MMOL/L (ref 132–146)
WBC OTHER # BLD: 6.2 K/UL (ref 4.5–11.5)

## 2023-11-06 PROCEDURE — 2060000000 HC ICU INTERMEDIATE R&B

## 2023-11-06 PROCEDURE — 2580000003 HC RX 258

## 2023-11-06 PROCEDURE — 99222 1ST HOSP IP/OBS MODERATE 55: CPT | Performed by: INTERNAL MEDICINE

## 2023-11-06 PROCEDURE — 84100 ASSAY OF PHOSPHORUS: CPT

## 2023-11-06 PROCEDURE — 6370000000 HC RX 637 (ALT 250 FOR IP)

## 2023-11-06 PROCEDURE — 85025 COMPLETE CBC W/AUTO DIFF WBC: CPT

## 2023-11-06 PROCEDURE — 93010 ELECTROCARDIOGRAM REPORT: CPT | Performed by: INTERNAL MEDICINE

## 2023-11-06 PROCEDURE — 6360000002 HC RX W HCPCS

## 2023-11-06 PROCEDURE — 83735 ASSAY OF MAGNESIUM: CPT

## 2023-11-06 PROCEDURE — 36415 COLL VENOUS BLD VENIPUNCTURE: CPT

## 2023-11-06 PROCEDURE — 80048 BASIC METABOLIC PNL TOTAL CA: CPT

## 2023-11-06 RX ORDER — METOPROLOL SUCCINATE 25 MG/1
25 TABLET, EXTENDED RELEASE ORAL DAILY
Status: DISCONTINUED | OUTPATIENT
Start: 2023-11-06 | End: 2023-11-07 | Stop reason: HOSPADM

## 2023-11-06 RX ORDER — LISINOPRIL 5 MG/1
10 TABLET ORAL DAILY
Status: DISCONTINUED | OUTPATIENT
Start: 2023-11-06 | End: 2023-11-07 | Stop reason: HOSPADM

## 2023-11-06 RX ORDER — POTASSIUM CHLORIDE 20 MEQ/1
40 TABLET, EXTENDED RELEASE ORAL ONCE
Status: COMPLETED | OUTPATIENT
Start: 2023-11-06 | End: 2023-11-06

## 2023-11-06 RX ADMIN — LISINOPRIL 10 MG: 5 TABLET ORAL at 12:23

## 2023-11-06 RX ADMIN — Medication 100 MG: at 08:34

## 2023-11-06 RX ADMIN — POTASSIUM CHLORIDE 40 MEQ: 1500 TABLET, EXTENDED RELEASE ORAL at 08:34

## 2023-11-06 RX ADMIN — ENOXAPARIN SODIUM 40 MG: 100 INJECTION SUBCUTANEOUS at 08:35

## 2023-11-06 RX ADMIN — SODIUM CHLORIDE, PRESERVATIVE FREE 10 ML: 5 INJECTION INTRAVENOUS at 20:32

## 2023-11-06 RX ADMIN — SODIUM CHLORIDE, PRESERVATIVE FREE 10 ML: 5 INJECTION INTRAVENOUS at 08:35

## 2023-11-06 RX ADMIN — ATORVASTATIN CALCIUM 20 MG: 20 TABLET, FILM COATED ORAL at 20:32

## 2023-11-06 RX ADMIN — METOPROLOL SUCCINATE 25 MG: 25 TABLET, EXTENDED RELEASE ORAL at 12:23

## 2023-11-06 NOTE — PROGRESS NOTES
4 Eyes Skin Assessment     NAME:  Misael Mata  YOB: 1965  MEDICAL RECORD NUMBER:  [id number]    The patient is being assessed for  Admission    I agree that at least one RN has performed a thorough Head to Toe Skin Assessment on the patient. ALL assessment sites listed below have been assessed. Areas assessed by both nurses:    Head, Face, Ears, Shoulders, Back, Chest, Arms, Elbows, Hands, Sacrum. Buttock, Coccyx, Ischium, Legs. Feet and Heels, and Under Medical Devices         Does the Patient have a Wound?  No noted wound(s)       Roberto Prevention initiated by RN: No  Wound Care Orders initiated by RN: No    Pressure Injury (Stage 3,4, Unstageable, DTI, NWPT, and Complex wounds) if present, place Wound referral order by RN under : No    New Ostomies, if present place, Ostomy referral order under : No     Nurse 1 eSignature: Electronically signed by Diann Jo RN on 11/6/23 at 3:53 AM EST    **SHARE this note so that the co-signing nurse can place an eSignature**    Nurse 2 eSignature: {Esignature:[id number]}

## 2023-11-06 NOTE — ACP (ADVANCE CARE PLANNING)
Pt refused to list an emergency contact      Electronically signed by Zeina Perez RN on 11/6/2023 at 12:28 PM

## 2023-11-06 NOTE — CARE COORDINATION
Transition of Care: Met with pt at bedside to discuss transition of care. Pt lives alone in a 3rd floor apartment with 3 flights of stairs, no elevator access. No DME. Independent with ADL's. Pt does not have a PCP, would like to continue care here with Internal Med Residents at the Clinic. Pharmacy, would like to use Meds to Beds. He is planning to return Home at discharge and may need help obtaining a ride home. Pt admitted after syncopal episode at [facility]. His BP was elevated, given prn Labetalol. PO Lisinopril started.  CM will follow and assist with discharge planning (TF)    6061--Pt does not want anyone listed as an emergency contact (TF)      Naina Lopez, [facility] [place]  Case Management  [phone]

## 2023-11-06 NOTE — PROGRESS NOTES
Orthostatic BP/pulse completed and documented in flow sheets. Lying /102, HR 73. Sitting /124, HR 72. Standing /101, HR 80.

## 2023-11-06 NOTE — PROGRESS NOTES
[facility]  Internal Medicine Residency / [facility]  [address] [place] [address]    Attending Physician Statement  I have discussed the case, including pertinent history and exam findings with the resident and the team.  I have seen and examined the patient and the key elements of the encounter have been performed by me. I have also personally reviewed imaging studies and labs. I agree with the assessment, plan and orders as documented by the resident. Assessment:  HTN emergency. BP significantly improved with prn IV medications. Last prescribed meds in January (1 month supply with 3 refills). (+) hiccups. Denies chest pain, shortness of breath, palpitations. No significant trop delta. EKG without acute ST changes  Syncopal episodes may be related to #1. PVCs on telemetry. Echo from previous admission reviewed - no significant structural diease. Less likely seizure given clinical hx and examination. Valdemar Swankle was only for seizure prophylaxis (7 days only) for ICH in January. Superficial forehead laceration present on admission,  likely from fall  [address] [facility] in January. No show for HFU scheduled 2/8/23. Plan:  Resume oral antihypertensive medications  Telemetry  Orthostatic VS  Replace K  Can follow with IM [facility] on discharge          Remainder of medical problems as per resident note. Yousif Randhawa MD  Internal Medicine

## 2023-11-07 VITALS
RESPIRATION RATE: 15 BRPM | DIASTOLIC BLOOD PRESSURE: 115 MMHG | OXYGEN SATURATION: 97 % | HEIGHT: 75 IN | WEIGHT: 167.99 LBS | TEMPERATURE: 98 F | BODY MASS INDEX: 20.89 KG/M2 | SYSTOLIC BLOOD PRESSURE: 150 MMHG | HEART RATE: 67 BPM

## 2023-11-07 LAB
ANION GAP SERPL CALCULATED.3IONS-SCNC: 12 MMOL/L (ref 7–16)
BASOPHILS # BLD: 0.07 K/UL (ref 0–0.2)
BASOPHILS NFR BLD: 1 % (ref 0–2)
BUN SERPL-MCNC: 14 MG/DL (ref 6–23)
CALCIUM SERPL-MCNC: 9 MG/DL (ref 8.6–10.2)
CHLORIDE SERPL-SCNC: 104 MMOL/L (ref 98–107)
CO2 SERPL-SCNC: 24 MMOL/L (ref 22–29)
CREAT SERPL-MCNC: 0.9 MG/DL (ref 0.7–1.2)
EOSINOPHIL # BLD: 0.4 K/UL (ref 0.05–0.5)
EOSINOPHILS RELATIVE PERCENT: 7 % (ref 0–6)
ERYTHROCYTE [DISTWIDTH] IN BLOOD BY AUTOMATED COUNT: 13.2 % (ref 11.5–15)
GFR SERPL CREATININE-BSD FRML MDRD: >60 ML/MIN/1.73M2
GLUCOSE SERPL-MCNC: 97 MG/DL (ref 74–99)
HCT VFR BLD AUTO: 43.9 % (ref 37–54)
HGB BLD-MCNC: 14.2 G/DL (ref 12.5–16.5)
IMM GRANULOCYTES # BLD AUTO: <0.03 K/UL (ref 0–0.58)
IMM GRANULOCYTES NFR BLD: 0 % (ref 0–5)
LYMPHOCYTES NFR BLD: 2.36 K/UL (ref 1.5–4)
LYMPHOCYTES RELATIVE PERCENT: 41 % (ref 20–42)
MAGNESIUM SERPL-MCNC: 1.8 MG/DL (ref 1.6–2.6)
MCH RBC QN AUTO: 28.3 PG (ref 26–35)
MCHC RBC AUTO-ENTMCNC: 32.3 G/DL (ref 32–34.5)
MCV RBC AUTO: 87.5 FL (ref 80–99.9)
MONOCYTES NFR BLD: 0.52 K/UL (ref 0.1–0.95)
MONOCYTES NFR BLD: 9 % (ref 2–12)
NEUTROPHILS NFR BLD: 41 % (ref 43–80)
NEUTS SEG NFR BLD: 2.36 K/UL (ref 1.8–7.3)
PHOSPHATE SERPL-MCNC: 3 MG/DL (ref 2.5–4.5)
PLATELET # BLD AUTO: 339 K/UL (ref 130–450)
PMV BLD AUTO: 10 FL (ref 7–12)
POTASSIUM SERPL-SCNC: 3.7 MMOL/L (ref 3.5–5)
RBC # BLD AUTO: 5.02 M/UL (ref 3.8–5.8)
SODIUM SERPL-SCNC: 140 MMOL/L (ref 132–146)
WBC OTHER # BLD: 5.7 K/UL (ref 4.5–11.5)

## 2023-11-07 PROCEDURE — 99238 HOSP IP/OBS DSCHRG MGMT 30/<: CPT | Performed by: INTERNAL MEDICINE

## 2023-11-07 PROCEDURE — 85025 COMPLETE CBC W/AUTO DIFF WBC: CPT

## 2023-11-07 PROCEDURE — 80048 BASIC METABOLIC PNL TOTAL CA: CPT

## 2023-11-07 PROCEDURE — 83735 ASSAY OF MAGNESIUM: CPT

## 2023-11-07 PROCEDURE — 6360000002 HC RX W HCPCS

## 2023-11-07 PROCEDURE — 93242 EXT ECG>48HR<7D RECORDING: CPT

## 2023-11-07 PROCEDURE — 2580000003 HC RX 258

## 2023-11-07 PROCEDURE — 84100 ASSAY OF PHOSPHORUS: CPT

## 2023-11-07 PROCEDURE — 6370000000 HC RX 637 (ALT 250 FOR IP)

## 2023-11-07 PROCEDURE — 36415 COLL VENOUS BLD VENIPUNCTURE: CPT

## 2023-11-07 RX ORDER — METOPROLOL SUCCINATE 25 MG/1
25 TABLET, EXTENDED RELEASE ORAL DAILY
Qty: 90 TABLET | Refills: 0 | Status: SHIPPED | OUTPATIENT
Start: 2023-11-07

## 2023-11-07 RX ORDER — LISINOPRIL 10 MG/1
10 TABLET ORAL DAILY
Qty: 90 TABLET | Refills: 0 | Status: SHIPPED | OUTPATIENT
Start: 2023-11-07 | End: 2023-11-07 | Stop reason: SDUPTHER

## 2023-11-07 RX ORDER — METOPROLOL SUCCINATE 25 MG/1
25 TABLET, EXTENDED RELEASE ORAL DAILY
Qty: 90 TABLET | Refills: 0 | Status: SHIPPED | OUTPATIENT
Start: 2023-11-07 | End: 2023-11-07 | Stop reason: SDUPTHER

## 2023-11-07 RX ORDER — LISINOPRIL 10 MG/1
10 TABLET ORAL DAILY
Qty: 90 TABLET | Refills: 0 | Status: SHIPPED | OUTPATIENT
Start: 2023-11-07

## 2023-11-07 RX ORDER — LANOLIN ALCOHOL/MO/W.PET/CERES
100 CREAM (GRAM) TOPICAL DAILY
Qty: 90 TABLET | Refills: 0 | Status: SHIPPED | OUTPATIENT
Start: 2023-11-07 | End: 2023-11-07 | Stop reason: SDUPTHER

## 2023-11-07 RX ORDER — ATORVASTATIN CALCIUM 20 MG/1
20 TABLET, FILM COATED ORAL NIGHTLY
Qty: 90 TABLET | Refills: 0 | Status: SHIPPED | OUTPATIENT
Start: 2023-11-07

## 2023-11-07 RX ORDER — ATORVASTATIN CALCIUM 20 MG/1
20 TABLET, FILM COATED ORAL NIGHTLY
Qty: 90 TABLET | Refills: 0 | Status: SHIPPED | OUTPATIENT
Start: 2023-11-07 | End: 2023-11-07 | Stop reason: SDUPTHER

## 2023-11-07 RX ORDER — POTASSIUM CHLORIDE 20 MEQ/1
20 TABLET, EXTENDED RELEASE ORAL ONCE
Status: COMPLETED | OUTPATIENT
Start: 2023-11-07 | End: 2023-11-07

## 2023-11-07 RX ORDER — LANOLIN ALCOHOL/MO/W.PET/CERES
100 CREAM (GRAM) TOPICAL DAILY
Qty: 90 TABLET | Refills: 0 | Status: SHIPPED | OUTPATIENT
Start: 2023-11-07

## 2023-11-07 RX ADMIN — SODIUM CHLORIDE, PRESERVATIVE FREE 10 ML: 5 INJECTION INTRAVENOUS at 08:34

## 2023-11-07 RX ADMIN — SODIUM CHLORIDE, PRESERVATIVE FREE 10 ML: 5 INJECTION INTRAVENOUS at 03:29

## 2023-11-07 RX ADMIN — POTASSIUM CHLORIDE 20 MEQ: 1500 TABLET, EXTENDED RELEASE ORAL at 09:56

## 2023-11-07 RX ADMIN — LISINOPRIL 10 MG: 5 TABLET ORAL at 08:34

## 2023-11-07 RX ADMIN — Medication 100 MG: at 08:34

## 2023-11-07 RX ADMIN — LABETALOL HYDROCHLORIDE 10 MG: 5 INJECTION INTRAVENOUS at 03:28

## 2023-11-07 RX ADMIN — METOPROLOL SUCCINATE 25 MG: 25 TABLET, EXTENDED RELEASE ORAL at 08:34

## 2023-11-07 NOTE — PROGRESS NOTES
CLINICAL PHARMACY NOTE: MEDS TO BEDS    Total # of Prescriptions Filled: 4   The following medications were delivered to the patient:  Metoprolol er 25 mg  Thiamine 100 mg  Atorvastatin 20 mg  Lisinopril 10 mg    Additional Documentation:     Delivered meds to patient at bedside

## 2023-11-07 NOTE — DISCHARGE INSTRUCTIONS
Internal medicine    Follow ups  Please follow up with the internal medicine clinic at [facility] appointment has been scheduled for November 15 th at 10 am    Changes in healthcare   Please take all medications as indicated  Diet: low fat, low cholesterol diet   Activity: activity as tolerated  New Medications started during this hospital stay  Atorvastatin 20 mg nightly  Medications you should stop taking   Tudorza inhaler  Albuterol inhaler  Keppra tablet  Nitroglycerin tablet  Additional labs, testing or imaging needed after discharge   Zio patch  Please contact us if you have any concerns, wish to change or make an appointment:  Internal medicine clinic   Phone: [phone]  Fax: [phone]  [address] [place] [address]  Or please call the nurse line at [phone]. Should you have further questions in regards to this visit, you can review your clinical note and after visit summary document on your [employer] account. Other questions can be directed to our nurse line at [phone]. Other than any new prescriptions given to you today, the list of home medications on this After Visit Summary are based on information provided to us from you and your healthcare providers. This information, including the list, dose, and frequency of medications is only as accurate as the information you provided. If you have any questions or concerns about your home medications, please contact your Primary Care Physician for further clarification.

## 2023-11-07 NOTE — PROGRESS NOTES
Skin for zio patch prepped/shaved. Zio placed, educated patient on how to use including pressing button when he is feeling any cardiac symptoms and how to log it. Explained that when showering to face away from shower head and not allow water to hit directly on the area. Educated on how to return monitor as well. All questions answered.

## 2023-11-07 NOTE — DISCHARGE SUMMARY
[address]  Discharge Summary    PCP: No primary care provider on file. Admit Date:11/5/2023  Discharge Date: 11/7/2023    Admission Diagnosis:   Hypertensive emergency likely 2/2 medication noncompliance  Syncopal episode likely 2/2 cardiogenic versus rule out other causes  History of HFpEF  History of hemorrhagic stroke, 1/2023  History of COPD  History of former tobacco use disorder    Discharge Diagnosis:  Hypertensive emergency likely 2/2 medication noncompliance-resolved  Syncopal episode likely 2/2 uncontrolled hypertension versus cardiogenic syncope versus rule out seizure, orthostatic hypotension, stroke  History of HFpEF  History of hemorrhagic stroke, 1/2023  History of COPD  History of former tobacco use disorder    Hospital Course: The patient is a 59 y.o. male , with past medical history of uncontrolled hypertension, tobacco use, HFrEF, COPD presented to the ED with the complaints of a syncopal episode while waiting for a table in restaurant. According to the patient he was waiting for a table this morning at [facility] when he suddenly lost consciousness. He was standing there approximately 15 minutes. The patient did not feel any dizziness, lightheadedness, visual disturbances, nausea vomiting before fainting. Patient does not know how long was he unconscious but states it might be around 15 minutes. He was alone and he is unaware of any seizure activity. No urinary incontinence or tongue bite. Patient does not know whether he hit his head but has a injury cornel in the left forehead. Ambulance brought him in ED. Patient does not feel any weakness, tingling, numbness of his extremities. He denies headache, chest pain, palpitation, nausea vomiting, diarrhea. Patient states he has not been taking any of his medications for a long time. He also had a fainting episode in January when he was hospitalized.   Patient denies any history of arrhythmia,

## 2023-11-07 NOTE — PROGRESS NOTES
[facility]  Internal Medicine Residency / [address]     Attending Physician Statement  I have discussed the case, including pertinent history and exam findings with the resident and the team.  I have seen and examined the patient and the key elements of the encounter have been performed by me. I have also personally reviewed imaging studies and labs. I agree with the assessment, plan and orders as documented by the resident. Assessment:  HTN, still slightly above goal but significantly improved. Concern for compliance after discharge. Missed HFU from Jan admission and states he was not taking meds/did not call to get refills. No recurrence of syncope  Superficial forehead laceration present on admission,  likely from fall  [address] in January. Completed keppra for seizure prophylaxis. Plan: For discharge today  BB + lisinopril for BP control - continue to adjust outpatient  We will refill all medications  Lives in [place] but prefers the [facility] pharmacy. Will do meds to beds today but pharmacy needs to be confirmed again during HFU to ensure compliance. Zio patch for longer monitoring for arrhythmia         Remainder of medical problems as per resident note. Obey Luong MD  Internal Medicine

## 2023-11-07 NOTE — CARE COORDINATION
CM Update: Plan at discharge remains Home with no anticipated needs. He would like to use Meds to beds at discharge. Spoke to [facility] and they confirmed they will schedule a hospital follow up appt for him in the clinic. PO antihypertensives continue. IV Labetalol prn continues, used twice last night.  CM will continue to follow (TF)      Mateo [facility]  Case Management  [phone]